# Patient Record
Sex: FEMALE | Race: OTHER | NOT HISPANIC OR LATINO
[De-identification: names, ages, dates, MRNs, and addresses within clinical notes are randomized per-mention and may not be internally consistent; named-entity substitution may affect disease eponyms.]

---

## 2021-11-30 ENCOUNTER — APPOINTMENT (OUTPATIENT)
Dept: ANTEPARTUM | Facility: CLINIC | Age: 31
End: 2021-11-30

## 2021-12-10 ENCOUNTER — EMERGENCY (EMERGENCY)
Facility: HOSPITAL | Age: 31
LOS: 1 days | Discharge: HOME | End: 2021-12-10
Attending: EMERGENCY MEDICINE
Payer: COMMERCIAL

## 2021-12-10 VITALS
DIASTOLIC BLOOD PRESSURE: 51 MMHG | HEART RATE: 79 BPM | OXYGEN SATURATION: 100 % | RESPIRATION RATE: 18 BRPM | SYSTOLIC BLOOD PRESSURE: 111 MMHG | TEMPERATURE: 98 F

## 2021-12-10 VITALS
TEMPERATURE: 98 F | HEART RATE: 92 BPM | RESPIRATION RATE: 18 BRPM | WEIGHT: 167.99 LBS | HEIGHT: 62 IN | DIASTOLIC BLOOD PRESSURE: 81 MMHG | SYSTOLIC BLOOD PRESSURE: 121 MMHG | OXYGEN SATURATION: 98 %

## 2021-12-10 LAB
ALBUMIN SERPL ELPH-MCNC: 4.4 G/DL — SIGNIFICANT CHANGE UP (ref 3.3–5)
ALP SERPL-CCNC: 62 U/L — SIGNIFICANT CHANGE UP (ref 40–120)
ALT FLD-CCNC: 16 U/L — SIGNIFICANT CHANGE UP (ref 10–45)
ANION GAP SERPL CALC-SCNC: 11 MMOL/L — SIGNIFICANT CHANGE UP (ref 5–17)
APPEARANCE UR: CLEAR — SIGNIFICANT CHANGE UP
AST SERPL-CCNC: 12 U/L — SIGNIFICANT CHANGE UP (ref 10–40)
BACTERIA # UR AUTO: NEGATIVE — SIGNIFICANT CHANGE UP
BASOPHILS # BLD AUTO: 0.03 K/UL — SIGNIFICANT CHANGE UP (ref 0–0.2)
BASOPHILS NFR BLD AUTO: 0.3 % — SIGNIFICANT CHANGE UP (ref 0–2)
BILIRUB SERPL-MCNC: 0.2 MG/DL — SIGNIFICANT CHANGE UP (ref 0.2–1.2)
BILIRUB UR-MCNC: NEGATIVE — SIGNIFICANT CHANGE UP
BUN SERPL-MCNC: 7 MG/DL — SIGNIFICANT CHANGE UP (ref 7–23)
CALCIUM SERPL-MCNC: 10.2 MG/DL — SIGNIFICANT CHANGE UP (ref 8.4–10.5)
CHLORIDE SERPL-SCNC: 101 MMOL/L — SIGNIFICANT CHANGE UP (ref 96–108)
CO2 SERPL-SCNC: 21 MMOL/L — LOW (ref 22–31)
COLOR SPEC: COLORLESS — SIGNIFICANT CHANGE UP
CREAT SERPL-MCNC: 0.47 MG/DL — LOW (ref 0.5–1.3)
DIFF PNL FLD: ABNORMAL
EOSINOPHIL # BLD AUTO: 0.06 K/UL — SIGNIFICANT CHANGE UP (ref 0–0.5)
EOSINOPHIL NFR BLD AUTO: 0.6 % — SIGNIFICANT CHANGE UP (ref 0–6)
EPI CELLS # UR: 2 /HPF — SIGNIFICANT CHANGE UP
GLUCOSE SERPL-MCNC: 89 MG/DL — SIGNIFICANT CHANGE UP (ref 70–99)
GLUCOSE UR QL: NEGATIVE — SIGNIFICANT CHANGE UP
HCT VFR BLD CALC: 38.4 % — SIGNIFICANT CHANGE UP (ref 34.5–45)
HGB BLD-MCNC: 12.7 G/DL — SIGNIFICANT CHANGE UP (ref 11.5–15.5)
HYALINE CASTS # UR AUTO: 1 /LPF — SIGNIFICANT CHANGE UP (ref 0–2)
IMM GRANULOCYTES NFR BLD AUTO: 0.4 % — SIGNIFICANT CHANGE UP (ref 0–1.5)
KETONES UR-MCNC: ABNORMAL
LEUKOCYTE ESTERASE UR-ACNC: ABNORMAL
LYMPHOCYTES # BLD AUTO: 1.05 K/UL — SIGNIFICANT CHANGE UP (ref 1–3.3)
LYMPHOCYTES # BLD AUTO: 10.9 % — LOW (ref 13–44)
MAGNESIUM SERPL-MCNC: 1.9 MG/DL — SIGNIFICANT CHANGE UP (ref 1.6–2.6)
MCHC RBC-ENTMCNC: 28.8 PG — SIGNIFICANT CHANGE UP (ref 27–34)
MCHC RBC-ENTMCNC: 33.1 GM/DL — SIGNIFICANT CHANGE UP (ref 32–36)
MCV RBC AUTO: 87.1 FL — SIGNIFICANT CHANGE UP (ref 80–100)
MONOCYTES # BLD AUTO: 0.62 K/UL — SIGNIFICANT CHANGE UP (ref 0–0.9)
MONOCYTES NFR BLD AUTO: 6.5 % — SIGNIFICANT CHANGE UP (ref 2–14)
NEUTROPHILS # BLD AUTO: 7.79 K/UL — HIGH (ref 1.8–7.4)
NEUTROPHILS NFR BLD AUTO: 81.3 % — HIGH (ref 43–77)
NITRITE UR-MCNC: NEGATIVE — SIGNIFICANT CHANGE UP
NRBC # BLD: 0 /100 WBCS — SIGNIFICANT CHANGE UP (ref 0–0)
PH UR: 7 — SIGNIFICANT CHANGE UP (ref 5–8)
PLATELET # BLD AUTO: 289 K/UL — SIGNIFICANT CHANGE UP (ref 150–400)
POTASSIUM SERPL-MCNC: 4.6 MMOL/L — SIGNIFICANT CHANGE UP (ref 3.5–5.3)
POTASSIUM SERPL-SCNC: 4.6 MMOL/L — SIGNIFICANT CHANGE UP (ref 3.5–5.3)
PROT SERPL-MCNC: 8.1 G/DL — SIGNIFICANT CHANGE UP (ref 6–8.3)
PROT UR-MCNC: NEGATIVE — SIGNIFICANT CHANGE UP
RBC # BLD: 4.41 M/UL — SIGNIFICANT CHANGE UP (ref 3.8–5.2)
RBC # FLD: 12.5 % — SIGNIFICANT CHANGE UP (ref 10.3–14.5)
RBC CASTS # UR COMP ASSIST: 5 /HPF — HIGH (ref 0–4)
SODIUM SERPL-SCNC: 133 MMOL/L — LOW (ref 135–145)
SP GR SPEC: 1.01 — LOW (ref 1.01–1.02)
UROBILINOGEN FLD QL: NEGATIVE — SIGNIFICANT CHANGE UP
WBC # BLD: 9.59 K/UL — SIGNIFICANT CHANGE UP (ref 3.8–10.5)
WBC # FLD AUTO: 9.59 K/UL — SIGNIFICANT CHANGE UP (ref 3.8–10.5)
WBC UR QL: 2 /HPF — SIGNIFICANT CHANGE UP (ref 0–5)

## 2021-12-10 PROCEDURE — 99285 EMERGENCY DEPT VISIT HI MDM: CPT

## 2021-12-10 PROCEDURE — 76801 OB US < 14 WKS SINGLE FETUS: CPT | Mod: 26

## 2021-12-10 PROCEDURE — 76817 TRANSVAGINAL US OBSTETRIC: CPT | Mod: 26

## 2021-12-10 PROCEDURE — 93975 VASCULAR STUDY: CPT | Mod: 26

## 2021-12-10 RX ORDER — METOCLOPRAMIDE HCL 10 MG
10 TABLET ORAL ONCE
Refills: 0 | Status: COMPLETED | OUTPATIENT
Start: 2021-12-10 | End: 2021-12-10

## 2021-12-10 RX ORDER — SODIUM CHLORIDE 9 MG/ML
1000 INJECTION, SOLUTION INTRAVENOUS ONCE
Refills: 0 | Status: COMPLETED | OUTPATIENT
Start: 2021-12-10 | End: 2021-12-10

## 2021-12-10 RX ORDER — SODIUM CHLORIDE 9 MG/ML
1000 INJECTION, SOLUTION INTRAVENOUS
Refills: 0 | Status: DISCONTINUED | OUTPATIENT
Start: 2021-12-10 | End: 2021-12-13

## 2021-12-10 RX ADMIN — SODIUM CHLORIDE 4000 MILLILITER(S): 9 INJECTION, SOLUTION INTRAVENOUS at 13:07

## 2021-12-10 RX ADMIN — Medication 10 MILLIGRAM(S): at 13:06

## 2021-12-10 NOTE — ED PROVIDER NOTE - CARE PLAN
1 Principal Discharge DX:	Hyperemesis gravidarum  Secondary Diagnosis:	Mild dehydration   Principal Discharge DX:	Fibroid uterus  Secondary Diagnosis:	Mild dehydration

## 2021-12-10 NOTE — ED ADULT NURSE NOTE - NS ED NURSE DISCH DISPOSITION
Initiate Treatment: Keep moisturized with Aquaphor Detail Level: Zone Plan: If culture positive will plan on treating with antiviral Discharged

## 2021-12-10 NOTE — ED PROVIDER NOTE - ATTENDING CONTRIBUTION TO CARE
------------ATTENDING NOTE------------   pt c/o nausea, small amts nbnb vomiting, decreased PO over past 2 days, feeling dehydrated, complicated as 11 wk EGA, no uterine bleeding or vaginal discharge, she has mild pressure on her bladder and increased frequency, no fevers/illness, labs/imaging overall wnl, mild starvation ketosis, ED Sign OUt pending close reassessments, PO challenge, rapid referral to OBGYN for likely dc.  - Sunil Ward MD   -----------------------------------------------

## 2021-12-10 NOTE — ED PROVIDER NOTE - NSFOLLOWUPINSTRUCTIONS_ED_ALL_ED_FT
1. Go to your scheduled OBGYN appointment.   2. Drink plenty of fluids to stay hydrated throughout the day. Eat bland foods such as bread, rice, crackers. Avoid spicy foods.   3. Return to the emergency department if you develop worsening pain, vaginal bleeding, inability to eat or drink, vomiting or any other concerning symptoms.

## 2021-12-10 NOTE — ED ADULT NURSE NOTE - CAS TRG GENERAL AIRWAY, MLM
The history, relevant review of systems, past medical and surgical history, medical decision making, and physical examination was documented by the scribe in my presence and I attest to the accuracy of the documentation.
Patent

## 2021-12-10 NOTE — ED PROVIDER NOTE - OBJECTIVE STATEMENT
31 y/o female  approximately 13 weeks pregnant with PMHx of uterine fibroids presents to the ED complaining of abdominal pain and nausea x 1 day. Patient states that she woke up this AM with sharp pains to her lower abdomen. Patient states that she was able to drink a little coffee but unable to eat breakfast due to pain. Admits to nausea but no vomiting. States that she feels "dehydrated". Patient states that this is her first pregnancy. She states that she has been going to a clinic on [address] and has initial US done. She states that everything has been normal so far with pregnancy. Admits to having intermittent pain but her GYN stated that this is normal due to her fibroids. Denies any recent illness, headache, fever, chills,  chest pain, diarrhea. No vaginal bleeding or discharge. No recent illness or sick contacts.        Used: [id number]

## 2021-12-10 NOTE — ED PROVIDER NOTE - PHYSICAL EXAMINATION
CONSTITUTIONAL: Patient is awake, alert and oriented x 3. Patient is well appearing and in no acute distress.  HEAD: NCAT  NECK: Supple,   LUNGS: CTA B/L, no wheezes, rhonci or rales  HEART: RRR.+S1S2   ABDOMEN: Soft, (+) lower abdominal tenderness to palpation b/l; non-distended,  no rebound tenderness to palpation, no guarding,    EXTREMITY: No edema or calf tenderness b/l, FROM upper and lower ext b/l,   SKIN: No rash or lesions  NEURO: No focal deficits,

## 2021-12-10 NOTE — ED PROVIDER NOTE - PATIENT PORTAL LINK FT
You can access the FollowMyHealth Patient Portal offered by [facility] by registering at the following website: [email or web address]. By joining [employer]’s FollowMyHealth portal, you will also be able to view your health information using other applications (apps) compatible with our system.

## 2021-12-10 NOTE — ED PROVIDER NOTE - PROGRESS NOTE DETAILS
Patient reassessed. Abdominal pain has improved. I informed patient that US results revealed "Single live intrauterine gestation with estimated gestational age of 11 weeks 5 days by crown-rump length. Mass within the cul-de-sac measuring 5.7 cm, which appears separate from the ovaries and is questioned to represent a pedunculated fibroid, though no discrete attachment to the uterus is identified." I explained all results to patient using . Recommended prompt GYN follow up. Patient states that she scheduled an appointment with a new GYN for this Thursday. I offered patient speculum exam. Patient declined and opted to wait for her GYN appointment. Answered all questions and concerns. Strict return precautions discussed. Doris iLnk PA-C

## 2021-12-10 NOTE — ED ADULT NURSE NOTE - NSIMPLEMENTINTERV_GEN_ALL_ED
Implemented All Universal Safety Interventions:  [place] to call system. Call bell, personal items and telephone within reach. Instruct patient to call for assistance. Room bathroom lighting operational. Non-slip footwear when patient is off stretcher. Physically safe environment: no spills, clutter or unnecessary equipment. Stretcher in lowest position, wheels locked, appropriate side rails in place.

## 2021-12-10 NOTE — ED ADULT NURSE NOTE - OBJECTIVE STATEMENT
Patient is a 31 y/o female 13 weeks pregnant presenting to the ED with c/o lower abdominal pain and back pain and painful urination. Patient describes the abdominal pain as stabbing. A&O x4, breathing spontaneously/unlabored, denies chest pain or SOB, abdomen soft, denies n/v/d, denies bleeding or discharge, radial and pedal pulses strong/equal bilaterally, cap refill less than 2 secs, moves all extremities w/o difficulty, denies fever, chills, cough. Patient is Chinese speaking,  utilized.

## 2021-12-12 LAB
CULTURE RESULTS: SIGNIFICANT CHANGE UP
SPECIMEN SOURCE: SIGNIFICANT CHANGE UP

## 2021-12-16 ENCOUNTER — LABORATORY RESULT (OUTPATIENT)
Age: 31
End: 2021-12-16

## 2021-12-16 ENCOUNTER — APPOINTMENT (OUTPATIENT)
Dept: OBGYN | Facility: CLINIC | Age: 31
End: 2021-12-16
Payer: COMMERCIAL

## 2021-12-16 VITALS
DIASTOLIC BLOOD PRESSURE: 72 MMHG | WEIGHT: 162 LBS | HEIGHT: 62 IN | SYSTOLIC BLOOD PRESSURE: 109 MMHG | BODY MASS INDEX: 29.81 KG/M2

## 2021-12-16 PROCEDURE — 76815 OB US LIMITED FETUS(S): CPT

## 2021-12-16 PROCEDURE — 99213 OFFICE O/P EST LOW 20 MIN: CPT

## 2021-12-17 LAB
ABO + RH PNL BLD: NORMAL
B19V IGG SER QL IA: 2.82 INDEX
B19V IGG+IGM SER-IMP: NORMAL
B19V IGG+IGM SER-IMP: POSITIVE
B19V IGM FLD-ACNC: 0.1 INDEX
B19V IGM SER-ACNC: NEGATIVE
BASOPHILS # BLD AUTO: 0.03 K/UL
BASOPHILS NFR BLD AUTO: 0.3 %
BLD GP AB SCN SERPL QL: NORMAL
C TRACH RRNA SPEC QL NAA+PROBE: NOT DETECTED
CMV IGG SERPL QL: 2.2 U/ML
CMV IGG SERPL-IMP: POSITIVE
CMV IGM SERPL QL: <8 AU/ML
CMV IGM SERPL QL: NEGATIVE
EOSINOPHIL # BLD AUTO: 0.09 K/UL
EOSINOPHIL NFR BLD AUTO: 1 %
HBV SURFACE AG SER QL: NONREACTIVE
HCT VFR BLD CALC: 34.3 %
HGB A MFR BLD: 97.1 %
HGB A2 MFR BLD: 2.9 %
HGB BLD-MCNC: 11.4 G/DL
HGB FRACT BLD-IMP: NORMAL
HIV1+2 AB SPEC QL IA.RAPID: NONREACTIVE
IMM GRANULOCYTES NFR BLD AUTO: 0.4 %
LYMPHOCYTES # BLD AUTO: 1.48 K/UL
LYMPHOCYTES NFR BLD AUTO: 16 %
MAN DIFF?: NORMAL
MCHC RBC-ENTMCNC: 29.1 PG
MCHC RBC-ENTMCNC: 33.2 GM/DL
MCV RBC AUTO: 87.5 FL
MEV IGG FLD QL IA: <5 AU/ML
MEV IGG+IGM SER-IMP: NEGATIVE
MONOCYTES # BLD AUTO: 0.65 K/UL
MONOCYTES NFR BLD AUTO: 7 %
N GONORRHOEA RRNA SPEC QL NAA+PROBE: NOT DETECTED
NEUTROPHILS # BLD AUTO: 6.97 K/UL
NEUTROPHILS NFR BLD AUTO: 75.3 %
PLATELET # BLD AUTO: 269 K/UL
RBC # BLD: 3.92 M/UL
RBC # FLD: 12.4 %
RUBV IGG FLD-ACNC: 3.8 INDEX
RUBV IGG SER-IMP: POSITIVE
SOURCE AMPLIFICATION: NORMAL
T PALLIDUM AB SER QL IA: NEGATIVE
TSH SERPL-ACNC: 1.76 UIU/ML
VZV AB TITR SER: POSITIVE
VZV IGG SER IF-ACNC: 1718 INDEX
WBC # FLD AUTO: 9.26 K/UL

## 2021-12-18 LAB — BACTERIA UR CULT: NORMAL

## 2021-12-20 ENCOUNTER — APPOINTMENT (OUTPATIENT)
Dept: ANTEPARTUM | Facility: CLINIC | Age: 31
End: 2021-12-20

## 2021-12-27 ENCOUNTER — NON-APPOINTMENT (OUTPATIENT)
Age: 31
End: 2021-12-27

## 2022-01-04 ENCOUNTER — APPOINTMENT (OUTPATIENT)
Dept: ANTEPARTUM | Facility: CLINIC | Age: 32
End: 2022-01-04
Payer: COMMERCIAL

## 2022-01-04 ENCOUNTER — ASOB RESULT (OUTPATIENT)
Age: 32
End: 2022-01-04

## 2022-01-04 PROCEDURE — 76805 OB US >/= 14 WKS SNGL FETUS: CPT

## 2022-01-13 ENCOUNTER — NON-APPOINTMENT (OUTPATIENT)
Age: 32
End: 2022-01-13

## 2022-01-13 ENCOUNTER — APPOINTMENT (OUTPATIENT)
Dept: OBGYN | Facility: CLINIC | Age: 32
End: 2022-01-13
Payer: COMMERCIAL

## 2022-01-13 VITALS
WEIGHT: 162 LBS | DIASTOLIC BLOOD PRESSURE: 72 MMHG | BODY MASS INDEX: 29.81 KG/M2 | HEIGHT: 62 IN | SYSTOLIC BLOOD PRESSURE: 104 MMHG

## 2022-01-13 PROCEDURE — 99213 OFFICE O/P EST LOW 20 MIN: CPT

## 2022-01-18 LAB
2ND TRIMESTER DATA: NORMAL
AFP PNL SERPL: NORMAL
AFP SERPL-ACNC: NORMAL
B-HCG FREE SERPL-MCNC: NORMAL
CLINICAL BIOCHEMIST REVIEW: NORMAL
INHIBIN A SERPL-MCNC: NORMAL
NOTES NTD: NORMAL
U ESTRIOL SERPL-SCNC: NORMAL

## 2022-02-03 ENCOUNTER — NON-APPOINTMENT (OUTPATIENT)
Age: 32
End: 2022-02-03

## 2022-02-03 ENCOUNTER — APPOINTMENT (OUTPATIENT)
Dept: OBGYN | Facility: CLINIC | Age: 32
End: 2022-02-03

## 2022-02-07 ENCOUNTER — NON-APPOINTMENT (OUTPATIENT)
Age: 32
End: 2022-02-07

## 2022-02-08 ENCOUNTER — OUTPATIENT (OUTPATIENT)
Dept: OUTPATIENT SERVICES | Facility: HOSPITAL | Age: 32
LOS: 1 days | End: 2022-02-08
Payer: MEDICAID

## 2022-02-08 ENCOUNTER — NON-APPOINTMENT (OUTPATIENT)
Age: 32
End: 2022-02-08

## 2022-02-08 ENCOUNTER — APPOINTMENT (OUTPATIENT)
Dept: ANTEPARTUM | Facility: CLINIC | Age: 32
End: 2022-02-08
Payer: COMMERCIAL

## 2022-02-08 ENCOUNTER — APPOINTMENT (OUTPATIENT)
Dept: OBGYN | Facility: CLINIC | Age: 32
End: 2022-02-08
Payer: MEDICAID

## 2022-02-08 ENCOUNTER — ASOB RESULT (OUTPATIENT)
Age: 32
End: 2022-02-08

## 2022-02-08 VITALS — BODY MASS INDEX: 30 KG/M2 | SYSTOLIC BLOOD PRESSURE: 112 MMHG | WEIGHT: 164 LBS | DIASTOLIC BLOOD PRESSURE: 60 MMHG

## 2022-02-08 DIAGNOSIS — Z34.80 ENCOUNTER FOR SUPERVISION OF OTHER NORMAL PREGNANCY, UNSPECIFIED TRIMESTER: ICD-10-CM

## 2022-02-08 PROCEDURE — 76817 TRANSVAGINAL US OBSTETRIC: CPT

## 2022-02-08 PROCEDURE — G0463: CPT

## 2022-02-08 PROCEDURE — 76811 OB US DETAILED SNGL FETUS: CPT

## 2022-02-08 PROCEDURE — 99212 OFFICE O/P EST SF 10 MIN: CPT | Mod: 25

## 2022-02-08 PROCEDURE — 81003 URINALYSIS AUTO W/O SCOPE: CPT

## 2022-02-16 LAB
AR GENE MUT ANL BLD/T: NORMAL
CFTR MUT TESTED BLD/T: NEGATIVE
CLARI ADDITIONAL INFO: NORMAL
CLARI CHROMOSOME 13: NORMAL
CLARI CHROMOSOME 18: NORMAL
CLARI CHROMOSOME 21: NORMAL
CLARI SEX CHROMOSOMES: NORMAL
CLARITEST NIPT: NORMAL
CYTOLOGY CVX/VAG DOC THIN PREP: ABNORMAL
FMR1 GENE MUT ANL BLD/T: NORMAL
HPV HIGH+LOW RISK DNA PNL CVX: DETECTED
LEAD BLD-MCNC: <1 UG/DL
MATERNAL WEIGHT (LBS):: NORMAL
PLEASE INCLUDE GENDER RESULTS ON THIS REPORT:: NORMAL
TYPE OF PREGNANCY:: NORMAL

## 2022-02-17 DIAGNOSIS — Z34.92 ENCOUNTER FOR SUPERVISION OF NORMAL PREGNANCY, UNSPECIFIED, SECOND TRIMESTER: ICD-10-CM

## 2022-03-01 ENCOUNTER — NON-APPOINTMENT (OUTPATIENT)
Age: 32
End: 2022-03-01

## 2022-03-08 ENCOUNTER — APPOINTMENT (OUTPATIENT)
Dept: OBGYN | Facility: CLINIC | Age: 32
End: 2022-03-08
Payer: MEDICAID

## 2022-03-08 ENCOUNTER — LABORATORY RESULT (OUTPATIENT)
Age: 32
End: 2022-03-08

## 2022-03-08 ENCOUNTER — OUTPATIENT (OUTPATIENT)
Dept: OUTPATIENT SERVICES | Facility: HOSPITAL | Age: 32
LOS: 1 days | End: 2022-03-08
Payer: MEDICAID

## 2022-03-08 VITALS
BODY MASS INDEX: 30.94 KG/M2 | DIASTOLIC BLOOD PRESSURE: 78 MMHG | WEIGHT: 168.13 LBS | SYSTOLIC BLOOD PRESSURE: 118 MMHG | HEIGHT: 62 IN

## 2022-03-08 DIAGNOSIS — Z34.80 ENCOUNTER FOR SUPERVISION OF OTHER NORMAL PREGNANCY, UNSPECIFIED TRIMESTER: ICD-10-CM

## 2022-03-08 DIAGNOSIS — O26.899 OTHER SPECIFIED PREGNANCY RELATED CONDITIONS, UNSPECIFIED TRIMESTER: ICD-10-CM

## 2022-03-08 DIAGNOSIS — G56.00 OTHER SPECIFIED PREGNANCY RELATED CONDITIONS, UNSPECIFIED TRIMESTER: ICD-10-CM

## 2022-03-08 PROCEDURE — 81003 URINALYSIS AUTO W/O SCOPE: CPT

## 2022-03-08 PROCEDURE — 99213 OFFICE O/P EST LOW 20 MIN: CPT | Mod: 25

## 2022-03-08 PROCEDURE — 82950 GLUCOSE TEST: CPT

## 2022-03-08 PROCEDURE — G0463: CPT

## 2022-03-09 LAB — GLUCOSE 1H P MEAL SERPL-MCNC: 98 MG/DL — SIGNIFICANT CHANGE UP (ref 70–134)

## 2022-03-17 DIAGNOSIS — Z34.91 ENCOUNTER FOR SUPERVISION OF NORMAL PREGNANCY, UNSPECIFIED, FIRST TRIMESTER: ICD-10-CM

## 2022-03-17 DIAGNOSIS — G56.00 CARPAL TUNNEL SYNDROME, UNSPECIFIED UPPER LIMB: ICD-10-CM

## 2022-03-17 DIAGNOSIS — O26.899 OTHER SPECIFIED PREGNANCY RELATED CONDITIONS, UNSPECIFIED TRIMESTER: ICD-10-CM

## 2022-03-21 ENCOUNTER — NON-APPOINTMENT (OUTPATIENT)
Age: 32
End: 2022-03-21

## 2022-03-21 ENCOUNTER — APPOINTMENT (OUTPATIENT)
Dept: ANTEPARTUM | Facility: CLINIC | Age: 32
End: 2022-03-21
Payer: MEDICAID

## 2022-03-21 ENCOUNTER — ASOB RESULT (OUTPATIENT)
Age: 32
End: 2022-03-21

## 2022-03-21 PROCEDURE — 76816 OB US FOLLOW-UP PER FETUS: CPT

## 2022-03-24 ENCOUNTER — NON-APPOINTMENT (OUTPATIENT)
Age: 32
End: 2022-03-24

## 2022-03-29 ENCOUNTER — LABORATORY RESULT (OUTPATIENT)
Age: 32
End: 2022-03-29

## 2022-03-29 ENCOUNTER — APPOINTMENT (OUTPATIENT)
Dept: OBGYN | Facility: CLINIC | Age: 32
End: 2022-03-29
Payer: MEDICAID

## 2022-03-29 ENCOUNTER — OUTPATIENT (OUTPATIENT)
Dept: OUTPATIENT SERVICES | Facility: HOSPITAL | Age: 32
LOS: 1 days | End: 2022-03-29
Payer: MEDICAID

## 2022-03-29 VITALS
HEIGHT: 62 IN | DIASTOLIC BLOOD PRESSURE: 78 MMHG | SYSTOLIC BLOOD PRESSURE: 118 MMHG | BODY MASS INDEX: 31.49 KG/M2 | WEIGHT: 171.13 LBS

## 2022-03-29 DIAGNOSIS — Z34.80 ENCOUNTER FOR SUPERVISION OF OTHER NORMAL PREGNANCY, UNSPECIFIED TRIMESTER: ICD-10-CM

## 2022-03-29 LAB
HCT VFR BLD CALC: 36.5 % — SIGNIFICANT CHANGE UP (ref 34.5–45)
HGB BLD-MCNC: 11.6 G/DL — SIGNIFICANT CHANGE UP (ref 11.5–15.5)
MCHC RBC-ENTMCNC: 28.6 PG — SIGNIFICANT CHANGE UP (ref 27–34)
MCHC RBC-ENTMCNC: 31.8 GM/DL — LOW (ref 32–36)
MCV RBC AUTO: 90.1 FL — SIGNIFICANT CHANGE UP (ref 80–100)
PLATELET # BLD AUTO: 245 K/UL — SIGNIFICANT CHANGE UP (ref 150–400)
RBC # BLD: 4.05 M/UL — SIGNIFICANT CHANGE UP (ref 3.8–5.2)
RBC # FLD: 13.8 % — SIGNIFICANT CHANGE UP (ref 10.3–14.5)
WBC # BLD: 9.28 K/UL — SIGNIFICANT CHANGE UP (ref 3.8–10.5)
WBC # FLD AUTO: 9.28 K/UL — SIGNIFICANT CHANGE UP (ref 3.8–10.5)

## 2022-03-29 PROCEDURE — 85027 COMPLETE CBC AUTOMATED: CPT

## 2022-03-29 PROCEDURE — G0463: CPT

## 2022-03-29 PROCEDURE — 81003 URINALYSIS AUTO W/O SCOPE: CPT

## 2022-03-29 PROCEDURE — 86780 TREPONEMA PALLIDUM: CPT

## 2022-03-29 PROCEDURE — 87389 HIV-1 AG W/HIV-1&-2 AB AG IA: CPT

## 2022-03-29 PROCEDURE — 99213 OFFICE O/P EST LOW 20 MIN: CPT | Mod: 25,TH

## 2022-03-30 LAB
HIV 1+2 AB+HIV1 P24 AG SERPL QL IA: SIGNIFICANT CHANGE UP
T PALLIDUM AB TITR SER: NEGATIVE — SIGNIFICANT CHANGE UP

## 2022-04-04 ENCOUNTER — NON-APPOINTMENT (OUTPATIENT)
Age: 32
End: 2022-04-04

## 2022-04-08 DIAGNOSIS — Z34.93 ENCOUNTER FOR SUPERVISION OF NORMAL PREGNANCY, UNSPECIFIED, THIRD TRIMESTER: ICD-10-CM

## 2022-04-08 DIAGNOSIS — Z34.91 ENCOUNTER FOR SUPERVISION OF NORMAL PREGNANCY, UNSPECIFIED, FIRST TRIMESTER: ICD-10-CM

## 2022-04-11 ENCOUNTER — OUTPATIENT (OUTPATIENT)
Dept: OUTPATIENT SERVICES | Facility: HOSPITAL | Age: 32
LOS: 1 days | End: 2022-04-11
Payer: MEDICAID

## 2022-04-11 ENCOUNTER — LABORATORY RESULT (OUTPATIENT)
Age: 32
End: 2022-04-11

## 2022-04-11 ENCOUNTER — APPOINTMENT (OUTPATIENT)
Dept: ANTEPARTUM | Facility: CLINIC | Age: 32
End: 2022-04-11
Payer: MEDICAID

## 2022-04-11 ENCOUNTER — ASOB RESULT (OUTPATIENT)
Age: 32
End: 2022-04-11

## 2022-04-11 ENCOUNTER — APPOINTMENT (OUTPATIENT)
Dept: OBGYN | Facility: CLINIC | Age: 32
End: 2022-04-11
Payer: MEDICAID

## 2022-04-11 VITALS — DIASTOLIC BLOOD PRESSURE: 80 MMHG | SYSTOLIC BLOOD PRESSURE: 120 MMHG | WEIGHT: 175 LBS | BODY MASS INDEX: 32.01 KG/M2

## 2022-04-11 VITALS
WEIGHT: 175 LBS | DIASTOLIC BLOOD PRESSURE: 82 MMHG | SYSTOLIC BLOOD PRESSURE: 120 MMHG | BODY MASS INDEX: 32.01 KG/M2 | TEMPERATURE: 98 F

## 2022-04-11 DIAGNOSIS — Z34.80 ENCOUNTER FOR SUPERVISION OF OTHER NORMAL PREGNANCY, UNSPECIFIED TRIMESTER: ICD-10-CM

## 2022-04-11 DIAGNOSIS — R05.9 COUGH, UNSPECIFIED: ICD-10-CM

## 2022-04-11 PROCEDURE — 76816 OB US FOLLOW-UP PER FETUS: CPT

## 2022-04-11 PROCEDURE — 76819 FETAL BIOPHYS PROFIL W/O NST: CPT

## 2022-04-11 PROCEDURE — 87086 URINE CULTURE/COLONY COUNT: CPT

## 2022-04-11 PROCEDURE — G0463: CPT

## 2022-04-11 PROCEDURE — 81002 URINALYSIS NONAUTO W/O SCOPE: CPT

## 2022-04-11 PROCEDURE — U0003: CPT

## 2022-04-11 PROCEDURE — U0005: CPT

## 2022-04-11 PROCEDURE — 99213 OFFICE O/P EST LOW 20 MIN: CPT | Mod: TH,25

## 2022-04-12 LAB — SARS-COV-2 RNA SPEC QL NAA+PROBE: SIGNIFICANT CHANGE UP

## 2022-04-13 LAB
CULTURE RESULTS: SIGNIFICANT CHANGE UP
SPECIMEN SOURCE: SIGNIFICANT CHANGE UP

## 2022-04-18 ENCOUNTER — NON-APPOINTMENT (OUTPATIENT)
Age: 32
End: 2022-04-18

## 2022-04-18 DIAGNOSIS — Z34.90 ENCOUNTER FOR SUPERVISION OF NORMAL PREGNANCY, UNSPECIFIED, UNSPECIFIED TRIMESTER: ICD-10-CM

## 2022-04-18 DIAGNOSIS — R05.9 COUGH, UNSPECIFIED: ICD-10-CM

## 2022-04-18 DIAGNOSIS — Z34.93 ENCOUNTER FOR SUPERVISION OF NORMAL PREGNANCY, UNSPECIFIED, THIRD TRIMESTER: ICD-10-CM

## 2022-04-18 DIAGNOSIS — R39.9 UNSPECIFIED SYMPTOMS AND SIGNS INVOLVING THE GENITOURINARY SYSTEM: ICD-10-CM

## 2022-04-26 ENCOUNTER — APPOINTMENT (OUTPATIENT)
Dept: OBGYN | Facility: CLINIC | Age: 32
End: 2022-04-26
Payer: MEDICAID

## 2022-04-26 ENCOUNTER — LABORATORY RESULT (OUTPATIENT)
Age: 32
End: 2022-04-26

## 2022-04-26 ENCOUNTER — OUTPATIENT (OUTPATIENT)
Dept: OUTPATIENT SERVICES | Facility: HOSPITAL | Age: 32
LOS: 1 days | End: 2022-04-26
Payer: MEDICAID

## 2022-04-26 ENCOUNTER — MED ADMIN CHARGE (OUTPATIENT)
Age: 32
End: 2022-04-26

## 2022-04-26 VITALS
BODY MASS INDEX: 32.24 KG/M2 | HEIGHT: 62 IN | WEIGHT: 175.19 LBS | SYSTOLIC BLOOD PRESSURE: 100 MMHG | DIASTOLIC BLOOD PRESSURE: 67 MMHG

## 2022-04-26 DIAGNOSIS — Z34.80 ENCOUNTER FOR SUPERVISION OF OTHER NORMAL PREGNANCY, UNSPECIFIED TRIMESTER: ICD-10-CM

## 2022-04-26 DIAGNOSIS — Z23 ENCOUNTER FOR IMMUNIZATION: ICD-10-CM

## 2022-04-26 PROCEDURE — 90715 TDAP VACCINE 7 YRS/> IM: CPT

## 2022-04-26 PROCEDURE — 81002 URINALYSIS NONAUTO W/O SCOPE: CPT

## 2022-04-26 PROCEDURE — G0463: CPT

## 2022-04-26 PROCEDURE — 90471 IMMUNIZATION ADMIN: CPT

## 2022-04-26 PROCEDURE — 87086 URINE CULTURE/COLONY COUNT: CPT

## 2022-04-26 PROCEDURE — 99213 OFFICE O/P EST LOW 20 MIN: CPT | Mod: TH,25

## 2022-04-26 PROCEDURE — 90471 IMMUNIZATION ADMIN: CPT | Mod: NC

## 2022-04-28 LAB
CULTURE RESULTS: SIGNIFICANT CHANGE UP
SPECIMEN SOURCE: SIGNIFICANT CHANGE UP

## 2022-05-02 DIAGNOSIS — Z23 ENCOUNTER FOR IMMUNIZATION: ICD-10-CM

## 2022-05-02 DIAGNOSIS — R39.9 UNSPECIFIED SYMPTOMS AND SIGNS INVOLVING THE GENITOURINARY SYSTEM: ICD-10-CM

## 2022-05-02 DIAGNOSIS — Z34.90 ENCOUNTER FOR SUPERVISION OF NORMAL PREGNANCY, UNSPECIFIED, UNSPECIFIED TRIMESTER: ICD-10-CM

## 2022-05-05 ENCOUNTER — NON-APPOINTMENT (OUTPATIENT)
Age: 32
End: 2022-05-05

## 2022-05-09 ENCOUNTER — APPOINTMENT (OUTPATIENT)
Dept: ANTEPARTUM | Facility: CLINIC | Age: 32
End: 2022-05-09
Payer: MEDICAID

## 2022-05-09 ENCOUNTER — ASOB RESULT (OUTPATIENT)
Age: 32
End: 2022-05-09

## 2022-05-09 PROCEDURE — 76816 OB US FOLLOW-UP PER FETUS: CPT

## 2022-05-10 ENCOUNTER — APPOINTMENT (OUTPATIENT)
Dept: OBGYN | Facility: CLINIC | Age: 32
End: 2022-05-10
Payer: MEDICAID

## 2022-05-10 ENCOUNTER — OUTPATIENT (OUTPATIENT)
Dept: OUTPATIENT SERVICES | Facility: HOSPITAL | Age: 32
LOS: 1 days | End: 2022-05-10
Payer: MEDICAID

## 2022-05-10 VITALS — SYSTOLIC BLOOD PRESSURE: 120 MMHG | BODY MASS INDEX: 32.19 KG/M2 | DIASTOLIC BLOOD PRESSURE: 78 MMHG | WEIGHT: 176 LBS

## 2022-05-10 DIAGNOSIS — Z34.80 ENCOUNTER FOR SUPERVISION OF OTHER NORMAL PREGNANCY, UNSPECIFIED TRIMESTER: ICD-10-CM

## 2022-05-10 PROCEDURE — G0463: CPT

## 2022-05-10 PROCEDURE — 81003 URINALYSIS AUTO W/O SCOPE: CPT

## 2022-05-10 PROCEDURE — 99213 OFFICE O/P EST LOW 20 MIN: CPT | Mod: TH,25

## 2022-05-12 DIAGNOSIS — Z34.93 ENCOUNTER FOR SUPERVISION OF NORMAL PREGNANCY, UNSPECIFIED, THIRD TRIMESTER: ICD-10-CM

## 2022-05-12 DIAGNOSIS — Z34.90 ENCOUNTER FOR SUPERVISION OF NORMAL PREGNANCY, UNSPECIFIED, UNSPECIFIED TRIMESTER: ICD-10-CM

## 2022-05-24 ENCOUNTER — OUTPATIENT (OUTPATIENT)
Dept: OUTPATIENT SERVICES | Facility: HOSPITAL | Age: 32
LOS: 1 days | End: 2022-05-24
Payer: MEDICAID

## 2022-05-24 ENCOUNTER — APPOINTMENT (OUTPATIENT)
Dept: OBGYN | Facility: CLINIC | Age: 32
End: 2022-05-24
Payer: MEDICAID

## 2022-05-24 VITALS — DIASTOLIC BLOOD PRESSURE: 74 MMHG | WEIGHT: 176 LBS | SYSTOLIC BLOOD PRESSURE: 116 MMHG | BODY MASS INDEX: 32.19 KG/M2

## 2022-05-24 DIAGNOSIS — Z34.80 ENCOUNTER FOR SUPERVISION OF OTHER NORMAL PREGNANCY, UNSPECIFIED TRIMESTER: ICD-10-CM

## 2022-05-24 PROCEDURE — 99213 OFFICE O/P EST LOW 20 MIN: CPT | Mod: TH,25

## 2022-05-24 PROCEDURE — 81002 URINALYSIS NONAUTO W/O SCOPE: CPT

## 2022-05-24 PROCEDURE — G0463: CPT

## 2022-05-31 DIAGNOSIS — Z34.93 ENCOUNTER FOR SUPERVISION OF NORMAL PREGNANCY, UNSPECIFIED, THIRD TRIMESTER: ICD-10-CM

## 2022-05-31 DIAGNOSIS — Z34.90 ENCOUNTER FOR SUPERVISION OF NORMAL PREGNANCY, UNSPECIFIED, UNSPECIFIED TRIMESTER: ICD-10-CM

## 2022-06-05 ENCOUNTER — LABORATORY RESULT (OUTPATIENT)
Age: 32
End: 2022-06-05

## 2022-06-06 ENCOUNTER — APPOINTMENT (OUTPATIENT)
Dept: ANTEPARTUM | Facility: CLINIC | Age: 32
End: 2022-06-06
Payer: MEDICAID

## 2022-06-06 ENCOUNTER — OUTPATIENT (OUTPATIENT)
Dept: OUTPATIENT SERVICES | Facility: HOSPITAL | Age: 32
LOS: 1 days | End: 2022-06-06
Payer: MEDICAID

## 2022-06-06 ENCOUNTER — APPOINTMENT (OUTPATIENT)
Dept: OBGYN | Facility: CLINIC | Age: 32
End: 2022-06-06
Payer: MEDICAID

## 2022-06-06 ENCOUNTER — ASOB RESULT (OUTPATIENT)
Age: 32
End: 2022-06-06

## 2022-06-06 VITALS
WEIGHT: 180.5 LBS | HEIGHT: 62 IN | DIASTOLIC BLOOD PRESSURE: 67 MMHG | SYSTOLIC BLOOD PRESSURE: 100 MMHG | BODY MASS INDEX: 33.21 KG/M2

## 2022-06-06 DIAGNOSIS — Z34.80 ENCOUNTER FOR SUPERVISION OF OTHER NORMAL PREGNANCY, UNSPECIFIED TRIMESTER: ICD-10-CM

## 2022-06-06 DIAGNOSIS — Z34.91 ENCOUNTER FOR SUPERVISION OF NORMAL PREGNANCY, UNSPECIFIED, FIRST TRIMESTER: ICD-10-CM

## 2022-06-06 PROCEDURE — G0463: CPT

## 2022-06-06 PROCEDURE — 99213 OFFICE O/P EST LOW 20 MIN: CPT | Mod: TH,25

## 2022-06-06 PROCEDURE — 76816 OB US FOLLOW-UP PER FETUS: CPT

## 2022-06-06 PROCEDURE — 81003 URINALYSIS AUTO W/O SCOPE: CPT

## 2022-06-06 PROCEDURE — 87653 STREP B DNA AMP PROBE: CPT

## 2022-06-06 PROCEDURE — 76819 FETAL BIOPHYS PROFIL W/O NST: CPT

## 2022-06-08 LAB
GROUP B BETA STREP DNA (PCR): DETECTED
GROUP B BETA STREP INTERPRETATION: SIGNIFICANT CHANGE UP
SOURCE GROUP B STREP: SIGNIFICANT CHANGE UP

## 2022-06-10 DIAGNOSIS — Z34.90 ENCOUNTER FOR SUPERVISION OF NORMAL PREGNANCY, UNSPECIFIED, UNSPECIFIED TRIMESTER: ICD-10-CM

## 2022-06-10 DIAGNOSIS — Z34.93 ENCOUNTER FOR SUPERVISION OF NORMAL PREGNANCY, UNSPECIFIED, THIRD TRIMESTER: ICD-10-CM

## 2022-06-13 ENCOUNTER — OUTPATIENT (OUTPATIENT)
Dept: OUTPATIENT SERVICES | Facility: HOSPITAL | Age: 32
LOS: 1 days | End: 2022-06-13
Payer: MEDICAID

## 2022-06-13 ENCOUNTER — LABORATORY RESULT (OUTPATIENT)
Age: 32
End: 2022-06-13

## 2022-06-13 ENCOUNTER — APPOINTMENT (OUTPATIENT)
Dept: OBGYN | Facility: CLINIC | Age: 32
End: 2022-06-13
Payer: MEDICAID

## 2022-06-13 VITALS
SYSTOLIC BLOOD PRESSURE: 100 MMHG | BODY MASS INDEX: 32.99 KG/M2 | DIASTOLIC BLOOD PRESSURE: 60 MMHG | WEIGHT: 180.38 LBS

## 2022-06-13 DIAGNOSIS — R39.9 UNSPECIFIED SYMPTOMS AND SIGNS INVOLVING THE GENITOURINARY SYSTEM: ICD-10-CM

## 2022-06-13 DIAGNOSIS — Z34.80 ENCOUNTER FOR SUPERVISION OF OTHER NORMAL PREGNANCY, UNSPECIFIED TRIMESTER: ICD-10-CM

## 2022-06-13 DIAGNOSIS — B37.3 CANDIDIASIS OF VULVA AND VAGINA: ICD-10-CM

## 2022-06-13 PROCEDURE — G0463: CPT

## 2022-06-13 PROCEDURE — 99213 OFFICE O/P EST LOW 20 MIN: CPT | Mod: TH,25

## 2022-06-13 PROCEDURE — 81003 URINALYSIS AUTO W/O SCOPE: CPT

## 2022-06-13 PROCEDURE — 87086 URINE CULTURE/COLONY COUNT: CPT

## 2022-06-13 RX ORDER — TERCONAZOLE 8 MG/G
0.8 CREAM VAGINAL DAILY
Qty: 1 | Refills: 0 | Status: ACTIVE | COMMUNITY
Start: 2022-06-13 | End: 1900-01-01

## 2022-06-15 LAB
CULTURE RESULTS: SIGNIFICANT CHANGE UP
SPECIMEN SOURCE: SIGNIFICANT CHANGE UP

## 2022-06-20 ENCOUNTER — NON-APPOINTMENT (OUTPATIENT)
Age: 32
End: 2022-06-20

## 2022-06-20 ENCOUNTER — OUTPATIENT (OUTPATIENT)
Dept: OUTPATIENT SERVICES | Facility: HOSPITAL | Age: 32
LOS: 1 days | End: 2022-06-20
Payer: MEDICAID

## 2022-06-20 ENCOUNTER — APPOINTMENT (OUTPATIENT)
Dept: OBGYN | Facility: CLINIC | Age: 32
End: 2022-06-20
Payer: MEDICAID

## 2022-06-20 VITALS
SYSTOLIC BLOOD PRESSURE: 118 MMHG | DIASTOLIC BLOOD PRESSURE: 80 MMHG | BODY MASS INDEX: 33.38 KG/M2 | HEIGHT: 62 IN | WEIGHT: 181.38 LBS

## 2022-06-20 DIAGNOSIS — Z34.90 ENCOUNTER FOR SUPERVISION OF NORMAL PREGNANCY, UNSPECIFIED, UNSPECIFIED TRIMESTER: ICD-10-CM

## 2022-06-20 DIAGNOSIS — Z34.80 ENCOUNTER FOR SUPERVISION OF OTHER NORMAL PREGNANCY, UNSPECIFIED TRIMESTER: ICD-10-CM

## 2022-06-20 DIAGNOSIS — Z34.93 ENCOUNTER FOR SUPERVISION OF NORMAL PREGNANCY, UNSPECIFIED, THIRD TRIMESTER: ICD-10-CM

## 2022-06-20 PROCEDURE — 81003 URINALYSIS AUTO W/O SCOPE: CPT

## 2022-06-20 PROCEDURE — 99213 OFFICE O/P EST LOW 20 MIN: CPT | Mod: TH,25

## 2022-06-20 PROCEDURE — G0463: CPT

## 2022-06-21 DIAGNOSIS — R39.9 UNSPECIFIED SYMPTOMS AND SIGNS INVOLVING THE GENITOURINARY SYSTEM: ICD-10-CM

## 2022-06-21 DIAGNOSIS — Z34.90 ENCOUNTER FOR SUPERVISION OF NORMAL PREGNANCY, UNSPECIFIED, UNSPECIFIED TRIMESTER: ICD-10-CM

## 2022-06-21 DIAGNOSIS — Z34.93 ENCOUNTER FOR SUPERVISION OF NORMAL PREGNANCY, UNSPECIFIED, THIRD TRIMESTER: ICD-10-CM

## 2022-06-21 DIAGNOSIS — B37.3 CANDIDIASIS OF VULVA AND VAGINA: ICD-10-CM

## 2022-06-23 ENCOUNTER — OUTPATIENT (OUTPATIENT)
Dept: OUTPATIENT SERVICES | Facility: HOSPITAL | Age: 32
LOS: 1 days | End: 2022-06-23
Payer: MEDICAID

## 2022-06-23 ENCOUNTER — INPATIENT (INPATIENT)
Facility: HOSPITAL | Age: 32
LOS: 1 days | Discharge: HOME | End: 2022-06-25
Attending: OBSTETRICS & GYNECOLOGY | Admitting: OBSTETRICS & GYNECOLOGY
Payer: MEDICAID

## 2022-06-23 VITALS — SYSTOLIC BLOOD PRESSURE: 124 MMHG | OXYGEN SATURATION: 99 % | DIASTOLIC BLOOD PRESSURE: 74 MMHG | HEART RATE: 100 BPM

## 2022-06-23 VITALS — HEART RATE: 98 BPM | SYSTOLIC BLOOD PRESSURE: 116 MMHG | DIASTOLIC BLOOD PRESSURE: 73 MMHG

## 2022-06-23 VITALS — DIASTOLIC BLOOD PRESSURE: 67 MMHG | HEART RATE: 89 BPM | SYSTOLIC BLOOD PRESSURE: 113 MMHG

## 2022-06-23 DIAGNOSIS — O26.899 OTHER SPECIFIED PREGNANCY RELATED CONDITIONS, UNSPECIFIED TRIMESTER: ICD-10-CM

## 2022-06-23 DIAGNOSIS — Z3A.00 WEEKS OF GESTATION OF PREGNANCY NOT SPECIFIED: ICD-10-CM

## 2022-06-23 DIAGNOSIS — Z34.80 ENCOUNTER FOR SUPERVISION OF OTHER NORMAL PREGNANCY, UNSPECIFIED TRIMESTER: ICD-10-CM

## 2022-06-23 LAB
BASOPHILS # BLD AUTO: 0.03 K/UL — SIGNIFICANT CHANGE UP (ref 0–0.2)
BASOPHILS NFR BLD AUTO: 0.2 % — SIGNIFICANT CHANGE UP (ref 0–2)
BLD GP AB SCN SERPL QL: NEGATIVE — SIGNIFICANT CHANGE UP
EOSINOPHIL # BLD AUTO: 0.02 K/UL — SIGNIFICANT CHANGE UP (ref 0–0.5)
EOSINOPHIL NFR BLD AUTO: 0.1 % — SIGNIFICANT CHANGE UP (ref 0–6)
HCT VFR BLD CALC: 40 % — SIGNIFICANT CHANGE UP (ref 34.5–45)
HGB BLD-MCNC: 13.2 G/DL — SIGNIFICANT CHANGE UP (ref 11.5–15.5)
IMM GRANULOCYTES NFR BLD AUTO: 0.4 % — SIGNIFICANT CHANGE UP (ref 0–1.5)
LYMPHOCYTES # BLD AUTO: 1.5 K/UL — SIGNIFICANT CHANGE UP (ref 1–3.3)
LYMPHOCYTES # BLD AUTO: 10.5 % — LOW (ref 13–44)
MCHC RBC-ENTMCNC: 28 PG — SIGNIFICANT CHANGE UP (ref 27–34)
MCHC RBC-ENTMCNC: 33 GM/DL — SIGNIFICANT CHANGE UP (ref 32–36)
MCV RBC AUTO: 84.7 FL — SIGNIFICANT CHANGE UP (ref 80–100)
MONOCYTES # BLD AUTO: 0.86 K/UL — SIGNIFICANT CHANGE UP (ref 0–0.9)
MONOCYTES NFR BLD AUTO: 6 % — SIGNIFICANT CHANGE UP (ref 2–14)
NEUTROPHILS # BLD AUTO: 11.85 K/UL — HIGH (ref 1.8–7.4)
NEUTROPHILS NFR BLD AUTO: 82.8 % — HIGH (ref 43–77)
NRBC # BLD: 0 /100 WBCS — SIGNIFICANT CHANGE UP (ref 0–0)
PLATELET # BLD AUTO: 194 K/UL — SIGNIFICANT CHANGE UP (ref 150–400)
RBC # BLD: 4.72 M/UL — SIGNIFICANT CHANGE UP (ref 3.8–5.2)
RBC # FLD: 13.5 % — SIGNIFICANT CHANGE UP (ref 10.3–14.5)
RH IG SCN BLD-IMP: POSITIVE — SIGNIFICANT CHANGE UP
SARS-COV-2 RNA SPEC QL NAA+PROBE: SIGNIFICANT CHANGE UP
WBC # BLD: 14.32 K/UL — HIGH (ref 3.8–10.5)
WBC # FLD AUTO: 14.32 K/UL — HIGH (ref 3.8–10.5)

## 2022-06-23 PROCEDURE — 59025 FETAL NON-STRESS TEST: CPT

## 2022-06-23 PROCEDURE — G0463: CPT

## 2022-06-23 PROCEDURE — 59409 OBSTETRICAL CARE: CPT | Mod: U9,GC

## 2022-06-23 RX ORDER — AER TRAVELER 0.5 G/1
1 SOLUTION RECTAL; TOPICAL EVERY 4 HOURS
Refills: 0 | Status: DISCONTINUED | OUTPATIENT
Start: 2022-06-23 | End: 2022-06-25

## 2022-06-23 RX ORDER — HYDROCORTISONE 1 %
1 OINTMENT (GRAM) TOPICAL EVERY 6 HOURS
Refills: 0 | Status: DISCONTINUED | OUTPATIENT
Start: 2022-06-23 | End: 2022-06-25

## 2022-06-23 RX ORDER — OXYTOCIN 10 UNIT/ML
333.33 VIAL (ML) INJECTION
Qty: 20 | Refills: 0 | Status: DISCONTINUED | OUTPATIENT
Start: 2022-06-23 | End: 2022-06-25

## 2022-06-23 RX ORDER — KETOROLAC TROMETHAMINE 30 MG/ML
30 SYRINGE (ML) INJECTION ONCE
Refills: 0 | Status: DISCONTINUED | OUTPATIENT
Start: 2022-06-23 | End: 2022-06-23

## 2022-06-23 RX ORDER — SODIUM CHLORIDE 9 MG/ML
1000 INJECTION, SOLUTION INTRAVENOUS
Refills: 0 | Status: DISCONTINUED | OUTPATIENT
Start: 2022-06-23 | End: 2022-06-24

## 2022-06-23 RX ORDER — OXYTOCIN 10 UNIT/ML
10 VIAL (ML) INJECTION ONCE
Refills: 0 | Status: COMPLETED | OUTPATIENT
Start: 2022-06-23 | End: 2022-06-23

## 2022-06-23 RX ORDER — ACETAMINOPHEN 500 MG
975 TABLET ORAL
Refills: 0 | Status: DISCONTINUED | OUTPATIENT
Start: 2022-06-23 | End: 2022-06-25

## 2022-06-23 RX ORDER — BENZOCAINE 10 %
1 GEL (GRAM) MUCOUS MEMBRANE EVERY 6 HOURS
Refills: 0 | Status: DISCONTINUED | OUTPATIENT
Start: 2022-06-23 | End: 2022-06-25

## 2022-06-23 RX ORDER — CITRIC ACID/SODIUM CITRATE 300-500 MG
15 SOLUTION, ORAL ORAL EVERY 6 HOURS
Refills: 0 | Status: DISCONTINUED | OUTPATIENT
Start: 2022-06-23 | End: 2022-06-24

## 2022-06-23 RX ORDER — OXYCODONE HYDROCHLORIDE 5 MG/1
5 TABLET ORAL
Refills: 0 | Status: DISCONTINUED | OUTPATIENT
Start: 2022-06-23 | End: 2022-06-25

## 2022-06-23 RX ORDER — MAGNESIUM HYDROXIDE 400 MG/1
30 TABLET, CHEWABLE ORAL
Refills: 0 | Status: DISCONTINUED | OUTPATIENT
Start: 2022-06-23 | End: 2022-06-25

## 2022-06-23 RX ORDER — PRAMOXINE HYDROCHLORIDE 150 MG/15G
1 AEROSOL, FOAM RECTAL EVERY 4 HOURS
Refills: 0 | Status: DISCONTINUED | OUTPATIENT
Start: 2022-06-23 | End: 2022-06-25

## 2022-06-23 RX ORDER — SODIUM CHLORIDE 9 MG/ML
3 INJECTION INTRAMUSCULAR; INTRAVENOUS; SUBCUTANEOUS EVERY 8 HOURS
Refills: 0 | Status: DISCONTINUED | OUTPATIENT
Start: 2022-06-23 | End: 2022-06-25

## 2022-06-23 RX ORDER — AMPICILLIN TRIHYDRATE 250 MG
1 CAPSULE ORAL EVERY 4 HOURS
Refills: 0 | Status: DISCONTINUED | OUTPATIENT
Start: 2022-06-23 | End: 2022-06-23

## 2022-06-23 RX ORDER — SIMETHICONE 80 MG/1
80 TABLET, CHEWABLE ORAL EVERY 4 HOURS
Refills: 0 | Status: DISCONTINUED | OUTPATIENT
Start: 2022-06-23 | End: 2022-06-25

## 2022-06-23 RX ORDER — LANOLIN
1 OINTMENT (GRAM) TOPICAL EVERY 6 HOURS
Refills: 0 | Status: DISCONTINUED | OUTPATIENT
Start: 2022-06-23 | End: 2022-06-25

## 2022-06-23 RX ORDER — SODIUM CHLORIDE 9 MG/ML
1000 INJECTION, SOLUTION INTRAVENOUS
Refills: 0 | Status: DISCONTINUED | OUTPATIENT
Start: 2022-06-23 | End: 2022-06-25

## 2022-06-23 RX ORDER — OXYCODONE HYDROCHLORIDE 5 MG/1
5 TABLET ORAL ONCE
Refills: 0 | Status: DISCONTINUED | OUTPATIENT
Start: 2022-06-23 | End: 2022-06-25

## 2022-06-23 RX ORDER — DIBUCAINE 1 %
1 OINTMENT (GRAM) RECTAL EVERY 6 HOURS
Refills: 0 | Status: DISCONTINUED | OUTPATIENT
Start: 2022-06-23 | End: 2022-06-25

## 2022-06-23 RX ORDER — TETANUS TOXOID, REDUCED DIPHTHERIA TOXOID AND ACELLULAR PERTUSSIS VACCINE, ADSORBED 5; 2.5; 8; 8; 2.5 [IU]/.5ML; [IU]/.5ML; UG/.5ML; UG/.5ML; UG/.5ML
0.5 SUSPENSION INTRAMUSCULAR ONCE
Refills: 0 | Status: DISCONTINUED | OUTPATIENT
Start: 2022-06-23 | End: 2022-06-25

## 2022-06-23 RX ORDER — AMPICILLIN TRIHYDRATE 250 MG
2 CAPSULE ORAL ONCE
Refills: 0 | Status: COMPLETED | OUTPATIENT
Start: 2022-06-23 | End: 2022-06-23

## 2022-06-23 RX ORDER — OXYTOCIN 10 UNIT/ML
4 VIAL (ML) INJECTION
Qty: 30 | Refills: 0 | Status: DISCONTINUED | OUTPATIENT
Start: 2022-06-23 | End: 2022-06-23

## 2022-06-23 RX ORDER — IBUPROFEN 200 MG
600 TABLET ORAL EVERY 6 HOURS
Refills: 0 | Status: COMPLETED | OUTPATIENT
Start: 2022-06-23 | End: 2023-05-22

## 2022-06-23 RX ORDER — DIPHENHYDRAMINE HCL 50 MG
25 CAPSULE ORAL EVERY 6 HOURS
Refills: 0 | Status: DISCONTINUED | OUTPATIENT
Start: 2022-06-23 | End: 2022-06-25

## 2022-06-23 RX ADMIN — Medication 10 UNIT(S): at 23:20

## 2022-06-23 RX ADMIN — Medication 4 MILLIUNIT(S)/MIN: at 21:07

## 2022-06-23 RX ADMIN — Medication 30 MILLIGRAM(S): at 23:56

## 2022-06-23 RX ADMIN — Medication 200 GRAM(S): at 18:20

## 2022-06-23 RX ADMIN — SODIUM CHLORIDE 125 MILLILITER(S): 9 INJECTION, SOLUTION INTRAVENOUS at 18:21

## 2022-06-23 RX ADMIN — Medication 108 GRAM(S): at 22:48

## 2022-06-23 NOTE — OB PROVIDER LABOR PROGRESS NOTE - ASSESSMENT
A/P: Admitted in early labor, SROM    - minimal cervical change  - will start pitocin    C. Diamant, PGY-3  d/w Dr. Bradford and Dr. Richards

## 2022-06-23 NOTE — OB RN TRIAGE NOTE - FALL HARM RISK - UNIVERSAL INTERVENTIONS
Bed in lowest position, wheels locked, appropriate side rails in place/Call bell, personal items and telephone in reach/Instruct patient to call for assistance before getting out of bed or chair/Non-slip footwear when patient is out of bed/[place] to call system/Physically safe environment - no spills, clutter or unnecessary equipment/Purposeful Proactive Rounding/Room/bathroom lighting operational, light cord in reach

## 2022-06-23 NOTE — OB PROVIDER TRIAGE NOTE - NSOBPROVIDERNOTE_OBGYN_ALL_OB_FT
Assessment  31y  at 39w4d presents for rule out labor; /-3 and daniela every 10 minutes. Not currently in labor.    Plan  - NST reactive and reassuring  - d/c with labor precautions  - Pt counseled on return to triage if experiencing decreased fetal movement, regular contractions every 3-4 minutes, leakage of fluid, vaginal bleeding    Plan per attending physician, Dr. Moustapha Townsend MD  PGY1

## 2022-06-23 NOTE — OB RN PATIENT PROFILE - FALL HARM RISK - UNIVERSAL INTERVENTIONS
Bed in lowest position, wheels locked, appropriate side rails in place/Call bell, personal items and telephone in reach/Instruct patient to call for assistance before getting out of bed or chair/Non-slip footwear when patient is out of bed/[place] to call system/Physically safe environment - no spills, clutter or unnecessary equipment/Purposeful Proactive Rounding/Room/bathroom lighting operational, light cord in reach

## 2022-06-23 NOTE — OB RN TRIAGE NOTE - FALL HARM RISK - UNIVERSAL INTERVENTIONS
Bed in lowest position, wheels locked, appropriate side rails in place/Call bell, personal items and telephone in reach/Instruct patient to call for assistance before getting out of bed or chair/Non-slip footwear when patient is out of bed/[place] to call system/Physically safe environment - no spills, clutter or unnecessary equipment/Purposeful Proactive Rounding/Room/bathroom lighting operational, light cord in reach

## 2022-06-23 NOTE — OB PROVIDER DELIVERY SUMMARY - NSSELHIDDEN_OBGYN_ALL_OB_FT
[NS_DeliveryAttending1_OBGYN_ALL_OB_FT:[id number]],[NS_DeliveryAssist1_OBGYN_ALL_OB_FT:[id number]=]

## 2022-06-23 NOTE — OB PROVIDER H&P - HISTORY OF PRESENT ILLNESS
HPI: 31y  at 39w4d presents for rule out rupture. +FM. +LOF. +CTXs. -VB. Pt denies any other concerns.    States after arriving home she felt a gush of clear fluid that soaked down to her socks, leaking since. Now daniela every 2 minutes.    Per chart deborah graham 66 L lateral posterior pedunculated myoma 3.86x3.28x4.45 cm.    – PNC: Denies prenatal issues. GBS (+)  EFW 3296g by sono.  – OBHx: denies  – GynHx: fibroids; denies endometriosis, ovarian cysts, abnormal pap smears, STIs  – PMH: denies  – PSH: denies  – Psych: denies   – Social: denies   – Meds: PNV   – Allergies: NKDA, salmon, pineapples, peanut butter  – Will accept blood transfusions? Yes    Objective  – VS  T(C): 36.8 (22 @ 13:48)  HR: 94 (22 @ 17:15)  BP: 124/74 (22 @ 16:45)  RR: 20 (22 @ 13:48)  SpO2: 98% (22 @ 17:15)    Physical Exam  CV: RRR  Pulm: breathing comfortably on RA  Abd: gravid, nontender  Extr: moving all extremities with ease    – Spec: grossly ruptured  – VE: 3.5/70/-3  – FHT: baseline 120, mod variability, +accels, -decels  – [place]: q 2 min  – EFW: 3296g by sono  – Sono: vertex HPI: 31y  at 39w4d presents for rule out rupture. +FM. +LOF. +CTXs. -VB. Pt denies any other concerns.    States after arriving home she felt a gush of clear fluid that soaked down to her socks, leaking since. Now daniela every 2 minutes.    Per chart deborah graham / L lateral posterior pedunculated myoma 3.86x3.28x4.45 cm.    Having a girl. Wants Nexplanon 6 weeks postpartum, no bridge contraception.    – PNC: Denies prenatal issues. GBS (+)  EFW 3296g by sono.  – OBHx: denies  – GynHx: fibroids; denies endometriosis, ovarian cysts, abnormal pap smears, STIs  – PMH: denies  – PSH: denies  – Psych: denies   – Social: denies   – Meds: PNV   – Allergies: NKDA, salmon, pineapples, peanut butter  – Will accept blood transfusions? Yes    Objective  – VS  T(C): 36.8 (22 @ 13:48)  HR: 94 (22 @ 17:15)  BP: 124/74 (22 @ 16:45)  RR: 20 (22 @ 13:48)  SpO2: 98% (22 @ 17:15)    Physical Exam  CV: RRR  Pulm: breathing comfortably on RA  Abd: gravid, nontender  Extr: moving all extremities with ease    – Spec: grossly ruptured  – VE: 3.5/70/-3  – FHT: baseline 120, mod variability, +accels, -decels  – [place]: q 2 min  – EFW: 3296g by sono  – Sono: vertex HPI: 31y  at 39w4d presents for rule out rupture. +FM. +LOF. +CTXs. -VB. Pt denies any other concerns.    States after arriving home she felt a gush of clear fluid that soaked down to her socks, leaking since. Now daniela every 2 minutes.    Per chart deborah graham 6/6 L lateral posterior pedunculated myoma 3.86x3.28x4.45 cm.    Having a girl. Wants Nexplanon 6 weeks postpartum, no bridge contraception.    – PNC: Denies prenatal issues. GBS (+)  EFW 3296g by sono.  – OBHx: denies  – GynHx: fibroids; denies endometriosis, ovarian cysts, abnormal pap smears, STIs  – PMH: asthma, no recent inhaler use, denies intubations, remote hospitlization  – PSH: denies  – Psych: denies   – Social: denies   – Meds: PNV   – Allergies: NKDA, salmon, pineapples, peanut butter  – Will accept blood transfusions? Yes    Objective  – VS  T(C): 36.8 (22 @ 13:48)  HR: 94 (22 @ 17:15)  BP: 124/74 (22 @ 16:45)  RR: 20 (22 @ 13:48)  SpO2: 98% (22 @ 17:15)    Physical Exam  CV: RRR  Pulm: breathing comfortably on RA  Abd: gravid, nontender  Extr: moving all extremities with ease    – Spec: grossly ruptured  – VE: 3.5/70/-3  – FHT: baseline 120, mod variability, +accels, -decels  – [place]: q 2 min  – EFW: 3296g by sono  – Sono: vertex

## 2022-06-23 NOTE — OB PROVIDER H&P - ATTENDING COMMENTS
/Attending:    3y/o  @39.4wks admitted in early labor with SROM @4pm and was clear.  Pt discussed with me; chart reviewed; pt seen and consented at bedside. I agree with the above Resident's assessment and plan.    Pt was seen earlier for ROL and was sent home and returned with SROM and cervical change.  PNC in LRC and was fairly uncomplicated. Pt with hx of asthma, but uses inhaler as needed. Hx of lat. posterior pedunculated 4.5cm fibroid.    Prenatal labs reviewed:   Blood Type: O+; GBS: +; HepB sAg: neg; HIV: neg; RPR: neg    EFW: 3296gms  FHT: BL: 125bpm; mod variability; +accels, intermittent variable decels; Cat-2  [place]: irreg ctx's q1-2min  SVE: 3-4/70/-3  Memb: SROM, clear at 4pm    COVID pending  H/H: 13.2/40; Plt: 194    A/P  -IUP @39.4wks admitted in early labor with SROM.  -Cat-2 tracing, but overall reassured as having accels and moderate variability; will resuscitate as needed and continue to monitor tracing.  -GBS +Floresita getting Amp for GBS prophylaxis.  -Anesthesia consulted for pain management and currently getting an Epidural.  -Will continue to monitor and reassess.  -Hx of asthma and will avoid Hemabate.  -Anticipate vaginal delivery at this time.    Dr. Nunez

## 2022-06-23 NOTE — OB PROVIDER TRIAGE NOTE - HISTORY OF PRESENT ILLNESS
R1 Triage H&P    Patient interviewed with [employer] Eric Batista #[id number]    Subjective  HPI: 31y  at 39w4d presents for rule out labor. +FM. -LOF. +CTXs. -VB. Pt denies any other concerns.    States contractions started at around 5am, have gradually decreased in frequency to now every 10 minutes. Uncomfortable but does not desire pain management at this time.    – PNC: Denies prenatal issues. GBS (+)  EFW 3296g by sono.  – OBHx: denies  – GynHx: fibroids; denies endometriosis, ovarian cysts, abnormal pap smears, STIs  – PMH: denies  – PSH: denies  – Psych: denies   – Social: denies   – Meds: PNV   – Allergies: NKDA, salmon, pineapples, peanut butter  – Will accept blood transfusions? Yes    Objective  – VS  T(C): 36.8 (22 @ 13:48)  HR: 98 (22 @ 13:48)  BP: 116/73 (22 @ 13:48)  RR: 20 (22 @ 13:48)  SpO2: --    Physical Exam  CV: RRR  Pulm: breathing comfortably on RA  Abd: gravid, nontender  Extr: moving all extremities with ease    – VE: 1/50/-3  – FHT: baseline 125, mod variability, +accels, -decels  – [place]: irregular, q7-8 min  – EFW: 3296g by extrapolation from sono

## 2022-06-23 NOTE — OB PROVIDER DELIVERY SUMMARY - NSPROVIDERDELIVERYNOTE_OBGYN_ALL_OB_FT
Spontaneous vaginal delivery of liveborn infant from OA position. Head, shoulders, and body delivered easily. Infant was suctioned. No mec. Cord was clamped and cut and infant was passed to mother. Placenta delivered intact with a 3 vessel cord. Fundal and bimanual massage was given and uterine fundus was found to be firm. Vaginal exam revealed an intact cervix, vaginal walls and sulci. Patient had a 2nd degree laceration in the perineum that was repaired with 2.0 vicryl suture as well as a small left periurethral laceration that was reapproximated with 3.0 vicryl suture. . Excellent hemostasis was noted. Count was correct x 2.     Female infant  Apgars 9/9 Spontaneous vaginal delivery of liveborn female infant from OA position. Head, shoulders, and body delivered easily. Infant was suctioned. No mec. Cord was clamped and cut and infant was passed to mother. Placenta delivered intact with a 3 vessel cord. Fundal and bimanual massage was given and uterine fundus was found to be firm. Vaginal exam revealed an intact cervix, vaginal walls and sulci. Patient had a 2nd degree laceration in the perineum that was repaired with 2.0 vicryl suture as well as a small left periurethral laceration that was reapproximated with 3.0 vicryl suture. . Excellent hemostasis was noted. Count was correct x 2.     Female infant  Apgars 9/9    /Attending:    I was present for the entire delivery and I agree with the above Resident's delivery note.    Dr. Nunez

## 2022-06-23 NOTE — OB RN PATIENT PROFILE - COMFORT/ACCEPTABLE PAIN LEVEL (0-10)
5 Oral Minoxidil Pregnancy And Lactation Text: This medication should only be used when clearly needed if you are pregnant, attempting to become pregnant or breast feeding.

## 2022-06-24 ENCOUNTER — TRANSCRIPTION ENCOUNTER (OUTPATIENT)
Age: 32
End: 2022-06-24

## 2022-06-24 LAB
COVID-19 SPIKE DOMAIN AB INTERP: POSITIVE
COVID-19 SPIKE DOMAIN ANTIBODY RESULT: >250 U/ML — HIGH
SARS-COV-2 IGG+IGM SERPL QL IA: >250 U/ML — HIGH
SARS-COV-2 IGG+IGM SERPL QL IA: POSITIVE
T PALLIDUM AB TITR SER: NEGATIVE — SIGNIFICANT CHANGE UP

## 2022-06-24 RX ORDER — IBUPROFEN 200 MG
1 TABLET ORAL
Qty: 0 | Refills: 0 | DISCHARGE
Start: 2022-06-24

## 2022-06-24 RX ORDER — IBUPROFEN 200 MG
600 TABLET ORAL EVERY 6 HOURS
Refills: 0 | Status: DISCONTINUED | OUTPATIENT
Start: 2022-06-24 | End: 2022-06-25

## 2022-06-24 RX ORDER — ACETAMINOPHEN 500 MG
3 TABLET ORAL
Qty: 0 | Refills: 0 | DISCHARGE
Start: 2022-06-24

## 2022-06-24 RX ADMIN — Medication 975 MILLIGRAM(S): at 21:30

## 2022-06-24 RX ADMIN — SODIUM CHLORIDE 3 MILLILITER(S): 9 INJECTION INTRAMUSCULAR; INTRAVENOUS; SUBCUTANEOUS at 05:48

## 2022-06-24 RX ADMIN — Medication 600 MILLIGRAM(S): at 11:20

## 2022-06-24 RX ADMIN — Medication 975 MILLIGRAM(S): at 16:06

## 2022-06-24 RX ADMIN — Medication 1 TABLET(S): at 11:20

## 2022-06-24 RX ADMIN — Medication 975 MILLIGRAM(S): at 04:14

## 2022-06-24 RX ADMIN — Medication 600 MILLIGRAM(S): at 23:17

## 2022-06-24 RX ADMIN — Medication 975 MILLIGRAM(S): at 09:38

## 2022-06-24 RX ADMIN — Medication 600 MILLIGRAM(S): at 06:42

## 2022-06-24 RX ADMIN — Medication 975 MILLIGRAM(S): at 08:38

## 2022-06-24 RX ADMIN — Medication 600 MILLIGRAM(S): at 12:20

## 2022-06-24 RX ADMIN — Medication 975 MILLIGRAM(S): at 03:44

## 2022-06-24 NOTE — OB RN DELIVERY SUMMARY - NSSELHIDDEN_OBGYN_ALL_OB_FT
[NS_DeliveryAttending1_OBGYN_ALL_OB_FT:[id number]],[NS_DeliveryAssist1_OBGYN_ALL_OB_FT:[id number]=]

## 2022-06-24 NOTE — DISCHARGE NOTE OB - PATIENT PORTAL LINK FT
You can access the FollowMyHealth Patient Portal offered by [facility] by registering at the following website: [email or web address]. By joining [employer]’s FollowMyHealth portal, you will also be able to view your health information using other applications (apps) compatible with our system.

## 2022-06-24 NOTE — DISCHARGE NOTE OB - NS MD DC FALL RISK RISK
For information on Fall & Injury Prevention, visit: [email or web address] OR  [email or web address] OR  https://www.cdc.gov/steadi/patient.html

## 2022-06-24 NOTE — DISCHARGE NOTE OB - PROVIDER TOKENS
FREE:[LAST:[[facility]],PHONE:[phone]],FAX:[(   [phone]    -],ADDRESS:[[address] [place] [address]]]

## 2022-06-24 NOTE — DISCHARGE NOTE OB - BREAST MILK IS MORE DIGESTIBLE, MAKING VOMITING, DIARRHEA, GAS AND CONSTIPATION LESS COMMON
----- Message from Mary Lou Reynolds MD sent at 2/18/2021  4:52 AM CST -----  Please inform patient of the following results  Cholesterol is normal  Hemoglobin lower than before-due to heavy periods.  Await pelvic ultrasound.  Slightly elevated creatinine-encourage hydration.  Advise avoiding OTC NSAIDs.  Vitamin-D is low-recommend OTC vitamin-D 5000 units daily  Blood sugar is normal  Liver function is normal   Statement Selected

## 2022-06-24 NOTE — DISCHARGE NOTE OB - HOSPITAL COURSE
Patient had uncomplicated vaginal delivery of a liveborn female infant. During postpartum course patient's vitals were stable, vaginal bleeding appropriate, and pain well controlled.  On day of discharge patient was ambulating, her pain controlled with oral medications, had adequate oral intake, and was voiding freely.  Discharge instructions and precautions were given.  Will return to clinic in 6 weeks for postpartum visit.    Patient had uncomplicated vaginal delivery of a liveborn female infant. During postpartum course patient's vitals were stable, vaginal bleeding appropriate, and pain well controlled.  On day of discharge patient was ambulating, her pain controlled with oral medications, had adequate oral intake, and was voiding freely.  Discharge instructions and precautions were given.  Will return to clinic in 6 weeks for postpartum visit. Pt opted for Nexplanon insertion at time of 6wk postpartum visit.

## 2022-06-24 NOTE — DISCHARGE NOTE OB - MEDICATION SUMMARY - MEDICATIONS TO TAKE
I will START or STAY ON the medications listed below when I get home from the hospital:    acetaminophen 325 mg oral tablet  -- 3 tab(s) by mouth every 6 hours  -- Indication: For for pain    ibuprofen 600 mg oral tablet  -- 1 tab(s) by mouth every 6 hours  -- Indication: For for pain    Prenatal Multivitamins with Folic Acid 1 mg oral tablet  -- 1 tab(s) by mouth once a day  -- Indication: For home med

## 2022-06-24 NOTE — DISCHARGE NOTE OB - CARE PROVIDERS DIRECT ADDRESSES
[FreeTextEntry1] : 62 yo male with pmhx as below is here for a f/up visit\par 2017 s/p lhc - mod 2v cad - lad and rca - med rx\par on high dose statin, 2018 s/p stress mpi - low risk \par et is stable\par on and off cp last few months, s/p stress echo\par no other cvs complains\par + weight gain\par ros is otherwise unrem\par complaint with meds and diet ,DirectAddress_Unknown

## 2022-06-24 NOTE — DISCHARGE NOTE OB - CARE PROVIDER_API CALL
[facility],   [address]  [address]  [place] [address]  Phone: [phone]  Fax: (   )    -  Follow Up Time:

## 2022-06-24 NOTE — DISCHARGE NOTE OB - CARE PLAN
Principal Discharge DX:	 (normal spontaneous vaginal delivery)  Assessment and plan of treatment:	After discharge, please stay on pelvic rest for 6 weeks, meaning no sexual intercourse, no tampons and no douching. Expect to have vaginal bleeding/spotting for up to six weeks.  The bleeding should get lighter and more white/light brown with time.  For bleeding soaking more than a pad an hour or passing clots greater than the size of your fist, come in to the emergency department.    Follow up in clinic in 6 weeks.   1

## 2022-06-24 NOTE — OB RN DELIVERY SUMMARY - NS_SEPSISRSKCALC_OBGYN_ALL_OB_FT
EOS calculated successfully. EOS Risk Factor: 0.5/1000 live births ([facility] national incidence); GA=39w5d; Temp=98.42; ROM=7.817; GBS='Positive'; Antibiotics='GBS specific antibiotics > 2 hrs prior to birth'

## 2022-06-25 VITALS
HEART RATE: 94 BPM | SYSTOLIC BLOOD PRESSURE: 121 MMHG | TEMPERATURE: 98 F | OXYGEN SATURATION: 98 % | DIASTOLIC BLOOD PRESSURE: 80 MMHG | RESPIRATION RATE: 18 BRPM

## 2022-06-25 PROCEDURE — 59050 FETAL MONITOR W/REPORT: CPT

## 2022-06-25 PROCEDURE — 87635 SARS-COV-2 COVID-19 AMP PRB: CPT

## 2022-06-25 PROCEDURE — 86900 BLOOD TYPING SEROLOGIC ABO: CPT

## 2022-06-25 PROCEDURE — 86780 TREPONEMA PALLIDUM: CPT

## 2022-06-25 PROCEDURE — G0463: CPT

## 2022-06-25 PROCEDURE — 90707 MMR VACCINE SC: CPT

## 2022-06-25 PROCEDURE — 85025 COMPLETE CBC W/AUTO DIFF WBC: CPT

## 2022-06-25 PROCEDURE — 86901 BLOOD TYPING SEROLOGIC RH(D): CPT

## 2022-06-25 PROCEDURE — 86769 SARS-COV-2 COVID-19 ANTIBODY: CPT

## 2022-06-25 PROCEDURE — 59025 FETAL NON-STRESS TEST: CPT

## 2022-06-25 PROCEDURE — 36415 COLL VENOUS BLD VENIPUNCTURE: CPT

## 2022-06-25 PROCEDURE — 86850 RBC ANTIBODY SCREEN: CPT

## 2022-06-25 RX ADMIN — Medication 600 MILLIGRAM(S): at 06:08

## 2022-06-25 RX ADMIN — Medication 600 MILLIGRAM(S): at 12:28

## 2022-06-25 RX ADMIN — Medication 600 MILLIGRAM(S): at 11:28

## 2022-06-25 RX ADMIN — Medication 975 MILLIGRAM(S): at 02:48

## 2022-06-25 RX ADMIN — Medication 975 MILLIGRAM(S): at 09:43

## 2022-06-25 RX ADMIN — Medication 1 TABLET(S): at 11:27

## 2022-06-25 RX ADMIN — Medication 975 MILLIGRAM(S): at 08:43

## 2022-06-25 RX ADMIN — Medication 0.5 MILLILITER(S): at 11:02

## 2022-06-25 NOTE — PROGRESS NOTE ADULT - ASSESSMENT
30yo PPD#2 s/p . Patient is stable and doing well post-partum.      #Postoperative state  - Tylenol, Motrin, Oxy PRN   - Reg diet. SLIV.  - VTE ppx: SCDs, OOB  - BC: 6wk pp Nexplanon    Dispo: discharge planning    Noelle R4
30y/o  PPD#1 from . Patient is currently in stable condition.    #Routine Postpartum Care  - Continue with PO analgesia  - Increase ambulation  - Continue regular diet  - Desires PP Nexplanon at 6wk appointment    Veronique Townsend  PGY-1

## 2022-06-25 NOTE — PROGRESS NOTE ADULT - SUBJECTIVE AND OBJECTIVE BOX
Patient seen and examined at bedside, no acute overnight events. No acute complaints, pain well controlled. Patient is ambulating, voiding spontaneously, passing gas, and tolerating regular diet. Denies CP, SOB, N/V, HA, blurred vision, epigastric pain.    Vital Signs Last 24 Hours  T(C): 36.8 (06-24-22 @ 05:21), Max: 37.2 (06-24-22 @ 03:00)  HR: 82 (06-24-22 @ 05:21) (82 - 190)  BP: 98/62 (06-24-22 @ 05:21) (98/62 - 136/60)  RR: 18 (06-24-22 @ 05:21) (17 - 20)  SpO2: 99% (06-24-22 @ 05:21) (83% - 100%)    Physical Exam:  General: NAD  Abdomen: Soft, non-tender, non-distended, fundus firm  Pelvic: Lochia wnl, external exam of perineum clean and dry without swelling    Labs:    Blood Type: O Positive  Antibody Screen: Negative  RPR: Negative               13.2   14.32 )-----------( 194      ( 06-23 @ 18:02 )             40.0         MEDICATIONS  (STANDING):  acetaminophen     Tablet .. 975 milliGRAM(s) Oral <User Schedule>  dextrose 5% + lactated ringers. 1000 milliLiter(s) (125 mL/Hr) IV Continuous <Continuous>  diphtheria/tetanus/pertussis (acellular) Vaccine (ADAcel) 0.5 milliLiter(s) IntraMuscular once  ibuprofen  Tablet. 600 milliGRAM(s) Oral every 6 hours  oxytocin Infusion 333.333 milliUNIT(s)/Min (1000 mL/Hr) IV Continuous <Continuous>  oxytocin Infusion 333.333 milliUNIT(s)/Min (1000 mL/Hr) IV Continuous <Continuous>  prenatal multivitamin 1 Tablet(s) Oral daily  sodium chloride 0.9% lock flush 3 milliLiter(s) IV Push every 8 hours    MEDICATIONS  (PRN):  benzocaine 20%/menthol 0.5% Spray 1 Spray(s) Topical every 6 hours PRN for Perineal discomfort  dibucaine 1% Ointment 1 Application(s) Topical every 6 hours PRN Perineal discomfort  diphenhydrAMINE 25 milliGRAM(s) Oral every 6 hours PRN Pruritus  hydrocortisone 1% Cream 1 Application(s) Topical every 6 hours PRN Moderate Pain (4-6)  lanolin Ointment 1 Application(s) Topical every 6 hours PRN nipple soreness  magnesium hydroxide Suspension 30 milliLiter(s) Oral two times a day PRN Constipation  oxyCODONE    IR 5 milliGRAM(s) Oral every 3 hours PRN Moderate to Severe Pain (4-10)  oxyCODONE    IR 5 milliGRAM(s) Oral once PRN Moderate to Severe Pain (4-10)  pramoxine 1%/zinc 5% Cream 1 Application(s) Topical every 4 hours PRN Moderate Pain (4-6)  simethicone 80 milliGRAM(s) Chew every 4 hours PRN Gas  witch hazel Pads 1 Application(s) Topical every 4 hours PRN Perineal discomfort  
OB Progress Note:  PPD#2    S: 30yo PPD#2 s/p . Patient feels well. Pain is well controlled. She is tolerating a regular diet, voiding spontaneously, and ambulating without difficulty. Denies CP/SOB. Denies lightheadedness/dizziness. Denies N/V.     O:  Vital Signs Last 24 Hrs  T(C): 36.6 (2022 06:01), Max: 37.4 (2022 17:15)  T(F): 97.8 (2022 06:01), Max: 99.3 (2022 17:15)  HR: 68 (2022 06:01) (68 - 90)  BP: 97/66 (2022 06:01) (97/66 - 121/74)  RR: 18 (2022 06:01) (17 - 18)  SpO2: 98% (2022 06:01) (95% - 99%)    Physical Exam:  General: NAD  Abdomen: soft, non-tender, non-distended, fundus firm  Vaginal: Lochia wnl  Extremities: No erythema/edema    MEDICATIONS  (STANDING):  acetaminophen     Tablet .. 975 milliGRAM(s) Oral <User Schedule>  dextrose 5% + lactated ringers. 1000 milliLiter(s) (125 mL/Hr) IV Continuous <Continuous>  diphtheria/tetanus/pertussis (acellular) Vaccine (ADAcel) 0.5 milliLiter(s) IntraMuscular once  ibuprofen  Tablet. 600 milliGRAM(s) Oral every 6 hours  measles/mumps/rubella Vaccine 0.5 milliLiter(s) SubCutaneous once  oxytocin Infusion 333.333 milliUNIT(s)/Min (1000 mL/Hr) IV Continuous <Continuous>  oxytocin Infusion 333.333 milliUNIT(s)/Min (1000 mL/Hr) IV Continuous <Continuous>  prenatal multivitamin 1 Tablet(s) Oral daily  sodium chloride 0.9% lock flush 3 milliLiter(s) IV Push every 8 hours    MEDICATIONS  (PRN):  benzocaine 20%/menthol 0.5% Spray 1 Spray(s) Topical every 6 hours PRN for Perineal discomfort  dibucaine 1% Ointment 1 Application(s) Topical every 6 hours PRN Perineal discomfort  diphenhydrAMINE 25 milliGRAM(s) Oral every 6 hours PRN Pruritus  hydrocortisone 1% Cream 1 Application(s) Topical every 6 hours PRN Moderate Pain (4-6)  lanolin Ointment 1 Application(s) Topical every 6 hours PRN nipple soreness  magnesium hydroxide Suspension 30 milliLiter(s) Oral two times a day PRN Constipation  oxyCODONE    IR 5 milliGRAM(s) Oral every 3 hours PRN Moderate to Severe Pain (4-10)  oxyCODONE    IR 5 milliGRAM(s) Oral once PRN Moderate to Severe Pain (4-10)  pramoxine 1%/zinc 5% Cream 1 Application(s) Topical every 4 hours PRN Moderate Pain (4-6)  simethicone 80 milliGRAM(s) Chew every 4 hours PRN Gas  witch hazel Pads 1 Application(s) Topical every 4 hours PRN Perineal discomfort      Labs:  Blood type: O Positive  Rubella IgG: RPR: Negative                          13.2   14.32<H> >-----------< 194    (  @ 18:02 )             40.0

## 2022-06-25 NOTE — PROGRESS NOTE ADULT - ATTENDING COMMENTS
Obstetrical  8am-6pm:  Patient seen and examined by me.  Agree with above resident note.  Rosalinda HAAS
PPD#2 s/p . Pt doing well. Fundus firm. Tolerating reg diet, voiding spontaneously. lochia wnl. She is breastfeeding. Reports good mood. She is excited to go home. Decline postpartum contraception. Will f/u in clinic for 6w postpartum visit- will consider 6 week Nexplanon.  Discharge precautions reviewed. all questions  RShibata

## 2022-06-28 ENCOUNTER — APPOINTMENT (OUTPATIENT)
Dept: OBGYN | Facility: CLINIC | Age: 32
End: 2022-06-28

## 2022-06-28 DIAGNOSIS — Z34.90 ENCOUNTER FOR SUPERVISION OF NORMAL PREGNANCY, UNSPECIFIED, UNSPECIFIED TRIMESTER: ICD-10-CM

## 2022-06-28 DIAGNOSIS — Z34.93 ENCOUNTER FOR SUPERVISION OF NORMAL PREGNANCY, UNSPECIFIED, THIRD TRIMESTER: ICD-10-CM

## 2022-08-01 ENCOUNTER — NON-APPOINTMENT (OUTPATIENT)
Age: 32
End: 2022-08-01

## 2022-08-04 ENCOUNTER — LABORATORY RESULT (OUTPATIENT)
Age: 32
End: 2022-08-04

## 2022-08-04 ENCOUNTER — APPOINTMENT (OUTPATIENT)
Dept: OBGYN | Facility: CLINIC | Age: 32
End: 2022-08-04

## 2022-08-04 ENCOUNTER — OUTPATIENT (OUTPATIENT)
Dept: OUTPATIENT SERVICES | Facility: HOSPITAL | Age: 32
LOS: 1 days | End: 2022-08-04
Payer: MEDICAID

## 2022-08-04 VITALS — BODY MASS INDEX: 29.81 KG/M2 | DIASTOLIC BLOOD PRESSURE: 67 MMHG | SYSTOLIC BLOOD PRESSURE: 100 MMHG | WEIGHT: 163 LBS

## 2022-08-04 DIAGNOSIS — Z01.419 ENCOUNTER FOR GYNECOLOGICAL EXAMINATION (GENERAL) (ROUTINE) W/OUT ABNORMAL FINDINGS: ICD-10-CM

## 2022-08-04 DIAGNOSIS — N76.0 ACUTE VAGINITIS: ICD-10-CM

## 2022-08-04 PROCEDURE — 87625 HPV TYPES 16 & 18 ONLY: CPT

## 2022-08-04 PROCEDURE — 99214 OFFICE O/P EST MOD 30 MIN: CPT | Mod: TH,25

## 2022-08-04 PROCEDURE — 87491 CHLMYD TRACH DNA AMP PROBE: CPT

## 2022-08-04 PROCEDURE — G0463: CPT

## 2022-08-04 PROCEDURE — 88141 CYTOPATH C/V INTERPRET: CPT

## 2022-08-04 PROCEDURE — 87624 HPV HI-RISK TYP POOLED RSLT: CPT

## 2022-08-04 PROCEDURE — 88175 CYTOPATH C/V AUTO FLUID REDO: CPT

## 2022-08-04 PROCEDURE — 87591 N.GONORRHOEAE DNA AMP PROB: CPT

## 2022-08-04 NOTE — HISTORY OF PRESENT ILLNESS
[Postpartum Follow Up] : postpartum follow up [Complications:___] : no complications [Last Pap Date: ___] : Last Pap Date: [unfilled] [Delivery Date: ___] : on [unfilled] [] : delivered by vaginal delivery [Female] : Delivery History: baby girl [Wt. ___] : weighing [unfilled] [Breastfeeding] : currently nursing [Back to Normal] : is back to normal in size [None] : no vaginal bleeding [Normal] : the vagina was normal [Cervix Sample Taken] : cervical sample taken for a Pap smear [Doing Well] : is doing well [No Sign of Infection] : is showing no signs of infection [de-identified] : Int #: [id number]  31 y.o. s/p  on 22 w/ baby girl Yesenia, weighing 6lb6oz.  No complaints. 1) Contraceptive management.  Pt unsure of what method she would like.  Extensively reviewed all options - pt selected [employer] and then we were setting up, pt changed her mind.  Pt reports that she wants to do more "research" and will use condoms in the meantime. 2) PP depression. Denies s/sxs. Met w/ SW today 3) Well woman [] pap/cultures collected [de-identified] : [] f/up PRN/ if decision is made re contraception. MONICA Rodriguez, PAC

## 2022-08-05 LAB
C TRACH RRNA SPEC QL NAA+PROBE: SIGNIFICANT CHANGE UP
HPV GENOTYPE 16: SIGNIFICANT CHANGE UP
HPV GENOTYPE 18/45: SIGNIFICANT CHANGE UP
HPV HIGH+LOW RISK DNA PNL CVX: DETECTED
N GONORRHOEA RRNA SPEC QL NAA+PROBE: SIGNIFICANT CHANGE UP
SPECIMEN SOURCE: SIGNIFICANT CHANGE UP

## 2022-08-08 ENCOUNTER — NON-APPOINTMENT (OUTPATIENT)
Age: 32
End: 2022-08-08

## 2022-08-08 LAB — CYTOLOGY SPEC DOC CYTO: SIGNIFICANT CHANGE UP

## 2022-08-11 DIAGNOSIS — Z01.419 ENCOUNTER FOR GYNECOLOGICAL EXAMINATION (GENERAL) (ROUTINE) WITHOUT ABNORMAL FINDINGS: ICD-10-CM

## 2022-08-24 ENCOUNTER — APPOINTMENT (OUTPATIENT)
Dept: OBGYN | Facility: CLINIC | Age: 32
End: 2022-08-24

## 2022-08-24 ENCOUNTER — RESULT CHARGE (OUTPATIENT)
Age: 32
End: 2022-08-24

## 2022-08-24 ENCOUNTER — OUTPATIENT (OUTPATIENT)
Dept: OUTPATIENT SERVICES | Facility: HOSPITAL | Age: 32
LOS: 1 days | End: 2022-08-24
Payer: MEDICAID

## 2022-08-24 DIAGNOSIS — Z00.00 ENCOUNTER FOR GENERAL ADULT MEDICAL EXAMINATION W/OUT ABNORMAL FINDINGS: ICD-10-CM

## 2022-08-24 DIAGNOSIS — N76.0 ACUTE VAGINITIS: ICD-10-CM

## 2022-08-24 LAB — HCG UR QL: NEGATIVE

## 2022-08-24 PROCEDURE — G0463: CPT

## 2022-08-24 PROCEDURE — 99213 OFFICE O/P EST LOW 20 MIN: CPT

## 2022-08-26 NOTE — HISTORY OF PRESENT ILLNESS
[FreeTextEntry1] : Patient presented for colposcopy given HPV POS pap, cytology wnl 8/2022. However, per chart review her Pap hisoty is as follows: \par \par  - 12/2021 HPV Pos (16/18/45 neg) ASCUS (during pregnancy, was not referred for colpo) \par  - 8/2022 HPV Pos (16/18/45 neg) cytology wnl (postpartum) \par \par Patient denies any prior h/o abnormal Paps and denies having a colposcopy in the past. \par \par As per ASCCP guidelines, given she is HPV 16/18 negative with normal cytology,  colposcopy is not indicated at this time. \par \par She nikhil RTC in 1y (8/2023) for repeat Pap/Cotesting. \par

## 2022-09-07 DIAGNOSIS — R87.820 CERVICAL LOW RISK HUMAN PAPILLOMAVIRUS (HPV) DNA TEST POSITIVE: ICD-10-CM

## 2022-12-15 NOTE — OB RN PATIENT PROFILE - NS_ANESTHECONSULT_OBGYN_ALL_OB
Physical Therapy Visit    Visit Type: Daily Treatment Note -  Progress Note  Visit: 3  Referring Provider: Nabeel Vallejo MD  Medical Diagnosis (from order): Diagnosis Information    Diagnosis  784.0 (ICD-9-CM) - G44.86 (ICD-10-CM) - Cervicogenic headache         SUBJECTIVE                                                                                                               Pain was improved from the last visit on 11/9/2022 but not resolved. It worsened again 3 days ago and she has not been able to improve it. She has to space her appointments due to long work days and   Functional Change: See above  Current Functional Limitations: Sleeping, previous level of function in all activities without pain    Pain / Symptoms  - Pain rating (out of 10): Current: 9       OBJECTIVE                                                                                                                    Observation   Reduced fascia mobility on the right thoracic, trunk and scapula  Painful to same and musculature, right upper traps and pecs  Decreased thoracic spine mobility T1-9 and painful                      Treatment     Therapeutic Exercise  Updated and performed home exercise program for management at home    Exercises  Latissimus Dorsi Stretch at Wall - 3 x daily - 2 reps - 30 hold   Single Arm Doorway Pec Stretch at 90 Degrees Abduction - 3 x daily - 2 reps - 30 hold   Seated Cervical Sidebending Stretch - 3 x daily - 2 reps - 30 hold  * Pec Minor Stretch - 3 x daily - 3 reps - 30 hold  * MTI - Thoracic Side Bend - 3 x daily - 5 reps  * Thoracic Extension Mobilization on Foam Roll - 2 x daily - 3 reps - 10 hold  * Open Book Chest Stretch on Towel Roll - 2 x daily - 2 reps - 30 hold    Manual Therapy   Silicone cupping with gliding for fascia release performed to thoracic fascia up to upper traps with 1 cups to increase interstitial space and desensify the nociceptors and promote mobility.  Performed in prone  position. Especially focused on restrictions on the right side  Patient instructed in possible side effects of remaining markings from the cups for 1-5 days.    STM to right sided scapular musculature and upper traps in prone.  STM to pec major and minor and subscapularis in supine  Instructed in self care post manual including heat and/or use and flushing system with 1-2 glasses water    Skilled input: verbal instruction/cues and as detailed above    Writer verbally educated and received verbal consent for hand placement, positioning of patient, and techniques to be performed today from patient for clothing adjustments for techniques and hand placement and palpation for techniques as described above and how they are pertinent to the patient's plan of care.    Home Exercise Program  *above indicates provided as part of home exercise program  Access Code: [email or web address]  URL: [email or web address]  Date: 12/15/2022  Prepared by: Cee Romo    Program Notes   turn palms forward, lift shoulders up, roll them back and let them relax on top of your ribcage.     Exercises  ? Seated Figure 4 Piriformis Stretch - 1 x daily - 7 x weekly - 3 sets - 10 reps  ? Latissimus Dorsi Stretch at Wall - 3 x daily - 2 reps - 30 hold  ? Single Arm Doorway Pec Stretch at 90 Degrees Abduction - 3 x daily - 2 reps - 30 hold  ? Seated Cervical Sidebending Stretch - 3 x daily - 2 reps - 30 hold  ? Pec Minor Stretch - 3 x daily - 3 reps - 30 hold  ? MTI - Thoracic Side Bend - 3 x daily - 5 reps  ? Thoracic Extension Mobilization on Foam Roll - 2 x daily - 3 reps - 10 hold  ? Open Book Chest Stretch on Towel Roll - 2 x daily - 2 reps - 30 hold           ASSESSMENT                                                                                                          Patient continues to have impairments and functional deficits as noted.  Patient will continue to benefit from skilled care as outlined.    She had relief  from her high levels of pain and was more easily able to lie on her back following manual treatment. She had improved tenderness and right sided fascia mobility. Muscle tightneses and imbalances were addressed to help her with her pain as she is not able to come back for 3 weeks. The plan of care needs to be extended due to this as in the current plan of care she was only able to come 3 times. She needs to progress scapular and core strength and further pain control to meet goals.   Pain/symptoms after session (out of 10): 5  Education:   - Results of above outlined education: Verbalizes understanding and Demonstrates understanding    PLAN                                                                                                                          Extend frequency and duration per below.  Frequency / Duration  1 times per week tapering as patient progresses for 12 weeks for an estimated total of 8 visits    Suggestions for next session as indicated: Progress per plan of care  Non-covered: FCE, dry needling, iontophoresis, driving evaluations, orthotics, TENS unit  Continue thoracic mobility, initiate soft tissue to thoracic and scapular fascia and musculature  Initiate core and deep neck flexor stabilization  Progress home exercise program for pain control and mobility/self care as she has limited time for appointments   -foam roll  -scapular strengthening  Additional postural stretching/strengthening      Goals  Decrease pain/symptoms to 2/10 at worst  Improve involved strength to 4 scapular and deep neck flexors  Improve involved ROM to within normal limits right cervical  The above improvements in impairments to assist in obtaining goals listed below  Long Term Goals: to be met by end of plan of care  1. Patient will demonstrate ability to negotiate level and unlevel surfaces at variable velocities, including change of direction without increased pain or instability to return to age appropriate and  community activities at prior level of function.  2. Patient will sleep at prior level of function per patient report.  3. Patient will rotate head to be able to see blind spots and behind car for safe driving.  4. Patient will be independent with progressed and modified home exercise program.  5. Neck Disability Index: Patient will complete form to reflect an improved score to less than or equal to 15% to indicate patient reported improvement in function/disability/impairment (minimal detectable change: 21%).  The above improvements in impairments to assist in obtaining goals listed below  Status: all LTGs progressing      Therapy procedure time and total treatment time can be found documented on the Time Entry flowsheet     N/A

## 2023-08-21 NOTE — ED ADULT NURSE NOTE - CAS ELECT INFOMATION PROVIDED
I have reviewed discharge instructions with the patient and family. The patient and family verbalized understanding.       Donny Acuna RN  08/20/23 [id number] DC instructions

## 2025-05-05 NOTE — DISCHARGE NOTE OB - NSDCQMSTAIRS_GEN_ALL_CORE
Alyssa Lindsey is a 26 year old female presenting for   Chief Complaint   Patient presents with    Office Visit    Physical     Reports that seasonal allergies are getting worse. Itchy eyes and congestion. Watery eyes.  Very minimal allergies to cats. Only if she touches cats and then touches face.     Reports right ear she has trouble hearing. Reports that this has been going on for the past 6 months. No pain unless under water or showering.     Would like to discuss vaping nicotine and stopping.     Denies Latex allergy or sensitivity.    Medication verified and med list updated  Refills needed today: Yes    Patient would like communication of their results via:    [facility]       Pneumococcal Vaccine 0-49 (2 of 2 - PCV)  Overdue since 8/30/2020    COVID-19 Vaccine (3 - 2024-25 season)  Overdue since 9/1/2024    Cervical Cancer Screening (Pap Smear - Every 3 Years)  Overdue since 2/7/2025           Following review of the above:  Patient wishes to discuss with clinician: Pneumococcal  Has OBGYN for cervical cancer screen    Note: Refer to final orders and clinician documentation.           Blood pressure 112/74, pulse (!) 102, height 5' 4.96\" (1.65 m), weight 63 kg (139 lb), SpO2 98%, not currently breastfeeding.   No